# Patient Record
Sex: FEMALE | Race: WHITE | NOT HISPANIC OR LATINO | URBAN - METROPOLITAN AREA
[De-identification: names, ages, dates, MRNs, and addresses within clinical notes are randomized per-mention and may not be internally consistent; named-entity substitution may affect disease eponyms.]

---

## 2017-01-02 ENCOUNTER — EMERGENCY (EMERGENCY)
Facility: HOSPITAL | Age: 57
LOS: 1 days | Discharge: HOME | End: 2017-01-02
Attending: EMERGENCY MEDICINE | Admitting: EMERGENCY MEDICINE
Payer: COMMERCIAL

## 2017-01-02 VITALS
RESPIRATION RATE: 16 BRPM | OXYGEN SATURATION: 100 % | HEART RATE: 106 BPM | SYSTOLIC BLOOD PRESSURE: 163 MMHG | DIASTOLIC BLOOD PRESSURE: 94 MMHG | TEMPERATURE: 98 F

## 2017-01-02 PROCEDURE — 70360 X-RAY EXAM OF NECK: CPT | Mod: 26

## 2017-01-02 PROCEDURE — 99283 EMERGENCY DEPT VISIT LOW MDM: CPT

## 2017-01-02 PROCEDURE — 71020: CPT | Mod: 26

## 2017-01-02 NOTE — ED PROVIDER NOTE - CONDUCTED A DETAILED DISCUSSION WITH PATIENT AND/OR GUARDIAN REGARDING, MDM
radiology results radiology results/need for outpatient follow-up/return to ED if symptoms worsen, persist or questions arise

## 2017-01-02 NOTE — ED PROVIDER NOTE - MEDICAL DECISION MAKING DETAILS
57 yo F ho presents with throat irritations s/p swallowing a small piece of plastic package of sauce package. pt feels much better and able to swallow xray no acut abnormalities suggesting fb. pt now much better drank water in the ED

## 2017-01-02 NOTE — ED ADULT TRIAGE NOTE - CHIEF COMPLAINT QUOTE
states accidently swallowed a piece of plastic, from Duck Sauce packet, throat feels irritated.  Patient is able to speak clearly, no drooling, and trying to expectorate in traige.

## 2017-01-02 NOTE — ED PROVIDER NOTE - OBJECTIVE STATEMENT
57 yo F with hx of hypothyroidism presenting with sore throat. pt states she bit off the plastic package of topical sauce and the small pice that she bit off she swallowed by mistake. this resulted in her feeling irritation in the back of her throat. pt denies HA< dizziness, cp, sob, abd pain, n/v/d or dysuria. pt complains that she felt something stuck in her throat and has been able to breath and swallow her saliva without difficulties

## 2017-01-02 NOTE — ED PROVIDER NOTE - PSH
Impacted [place]  Tooth  1980  S/P Carpal Tunnel Release  right - 2000  S/P Left Oophorectomy  1981

## 2021-01-29 ENCOUNTER — APPOINTMENT (OUTPATIENT)
Dept: CV DIAGNOSITCS | Facility: HOSPITAL | Age: 61
End: 2021-01-29
Payer: COMMERCIAL

## 2021-01-29 ENCOUNTER — APPOINTMENT (OUTPATIENT)
Dept: CV DIAGNOSTICS | Facility: HOSPITAL | Age: 61
End: 2021-01-29
Payer: COMMERCIAL

## 2021-01-29 ENCOUNTER — OUTPATIENT (OUTPATIENT)
Dept: OUTPATIENT SERVICES | Facility: HOSPITAL | Age: 61
LOS: 1 days | End: 2021-01-29

## 2021-01-29 DIAGNOSIS — R07.9 CHEST PAIN, UNSPECIFIED: ICD-10-CM

## 2021-01-29 DIAGNOSIS — I10 ESSENTIAL (PRIMARY) HYPERTENSION: ICD-10-CM

## 2021-01-29 DIAGNOSIS — E78.5 HYPERLIPIDEMIA, UNSPECIFIED: ICD-10-CM

## 2021-01-29 PROCEDURE — 93018 CV STRESS TEST I&R ONLY: CPT | Mod: GC

## 2021-01-29 PROCEDURE — 93880 EXTRACRANIAL BILAT STUDY: CPT | Mod: 26

## 2021-01-29 PROCEDURE — 93016 CV STRESS TEST SUPVJ ONLY: CPT | Mod: GC

## 2022-04-07 ENCOUNTER — OUTPATIENT (OUTPATIENT)
Dept: OUTPATIENT SERVICES | Facility: HOSPITAL | Age: 62
LOS: 1 days | End: 2022-04-07
Payer: COMMERCIAL

## 2022-04-07 ENCOUNTER — TRANSCRIPTION ENCOUNTER (OUTPATIENT)
Age: 62
End: 2022-04-07

## 2022-04-07 VITALS
HEART RATE: 66 BPM | DIASTOLIC BLOOD PRESSURE: 66 MMHG | SYSTOLIC BLOOD PRESSURE: 130 MMHG | OXYGEN SATURATION: 97 % | RESPIRATION RATE: 18 BRPM

## 2022-04-07 VITALS
HEIGHT: 62 IN | SYSTOLIC BLOOD PRESSURE: 137 MMHG | HEART RATE: 70 BPM | DIASTOLIC BLOOD PRESSURE: 65 MMHG | WEIGHT: 149.91 LBS | TEMPERATURE: 98 F | OXYGEN SATURATION: 96 % | RESPIRATION RATE: 16 BRPM

## 2022-04-07 DIAGNOSIS — E89.0 POSTPROCEDURAL HYPOTHYROIDISM: Chronic | ICD-10-CM

## 2022-04-07 DIAGNOSIS — R06.00 DYSPNEA, UNSPECIFIED: ICD-10-CM

## 2022-04-07 DIAGNOSIS — R94.39 ABNORMAL RESULT OF OTHER CARDIOVASCULAR FUNCTION STUDY: ICD-10-CM

## 2022-04-07 LAB
ANION GAP SERPL CALC-SCNC: 14 MMOL/L — SIGNIFICANT CHANGE UP (ref 5–17)
BUN SERPL-MCNC: 15 MG/DL — SIGNIFICANT CHANGE UP (ref 7–23)
CALCIUM SERPL-MCNC: 9.4 MG/DL — SIGNIFICANT CHANGE UP (ref 8.4–10.5)
CHLORIDE SERPL-SCNC: 103 MMOL/L — SIGNIFICANT CHANGE UP (ref 96–108)
CO2 SERPL-SCNC: 26 MMOL/L — SIGNIFICANT CHANGE UP (ref 22–31)
CREAT SERPL-MCNC: 0.83 MG/DL — SIGNIFICANT CHANGE UP (ref 0.5–1.3)
EGFR: 79 ML/MIN/1.73M2 — SIGNIFICANT CHANGE UP
GLUCOSE SERPL-MCNC: 92 MG/DL — SIGNIFICANT CHANGE UP (ref 70–99)
HCT VFR BLD CALC: 42.3 % — SIGNIFICANT CHANGE UP (ref 34.5–45)
HGB BLD-MCNC: 14.2 G/DL — SIGNIFICANT CHANGE UP (ref 11.5–15.5)
MCHC RBC-ENTMCNC: 29.6 PG — SIGNIFICANT CHANGE UP (ref 27–34)
MCHC RBC-ENTMCNC: 33.6 GM/DL — SIGNIFICANT CHANGE UP (ref 32–36)
MCV RBC AUTO: 88.3 FL — SIGNIFICANT CHANGE UP (ref 80–100)
NRBC # BLD: 0 /100 WBCS — SIGNIFICANT CHANGE UP (ref 0–0)
PLATELET # BLD AUTO: 265 K/UL — SIGNIFICANT CHANGE UP (ref 150–400)
POTASSIUM SERPL-MCNC: 3.7 MMOL/L — SIGNIFICANT CHANGE UP (ref 3.5–5.3)
POTASSIUM SERPL-SCNC: 3.7 MMOL/L — SIGNIFICANT CHANGE UP (ref 3.5–5.3)
RBC # BLD: 4.79 M/UL — SIGNIFICANT CHANGE UP (ref 3.8–5.2)
RBC # FLD: 12.7 % — SIGNIFICANT CHANGE UP (ref 10.3–14.5)
SODIUM SERPL-SCNC: 143 MMOL/L — SIGNIFICANT CHANGE UP (ref 135–145)
WBC # BLD: 6.06 K/UL — SIGNIFICANT CHANGE UP (ref 3.8–10.5)
WBC # FLD AUTO: 6.06 K/UL — SIGNIFICANT CHANGE UP (ref 3.8–10.5)

## 2022-04-07 PROCEDURE — 93458 L HRT ARTERY/VENTRICLE ANGIO: CPT

## 2022-04-07 PROCEDURE — 99152 MOD SED SAME PHYS/QHP 5/>YRS: CPT

## 2022-04-07 PROCEDURE — 93005 ELECTROCARDIOGRAM TRACING: CPT

## 2022-04-07 PROCEDURE — 93458 L HRT ARTERY/VENTRICLE ANGIO: CPT | Mod: 26

## 2022-04-07 PROCEDURE — 80048 BASIC METABOLIC PNL TOTAL CA: CPT

## 2022-04-07 PROCEDURE — 36415 COLL VENOUS BLD VENIPUNCTURE: CPT

## 2022-04-07 PROCEDURE — 85027 COMPLETE CBC AUTOMATED: CPT

## 2022-04-07 PROCEDURE — C1894: CPT

## 2022-04-07 PROCEDURE — C1887: CPT

## 2022-04-07 PROCEDURE — 93010 ELECTROCARDIOGRAM REPORT: CPT

## 2022-04-07 PROCEDURE — C1769: CPT

## 2022-04-07 RX ORDER — SODIUM CHLORIDE 9 MG/ML
1000 INJECTION INTRAMUSCULAR; INTRAVENOUS; SUBCUTANEOUS
Refills: 0 | Status: COMPLETED | OUTPATIENT
Start: 2022-04-07 | End: 2022-04-07

## 2022-04-07 RX ORDER — SODIUM CHLORIDE 9 MG/ML
250 INJECTION INTRAMUSCULAR; INTRAVENOUS; SUBCUTANEOUS ONCE
Refills: 0 | Status: DISCONTINUED | OUTPATIENT
Start: 2022-04-07 | End: 2022-04-21

## 2022-04-07 RX ORDER — SODIUM CHLORIDE 9 MG/ML
3 INJECTION INTRAMUSCULAR; INTRAVENOUS; SUBCUTANEOUS EVERY 8 HOURS
Refills: 0 | Status: DISCONTINUED | OUTPATIENT
Start: 2022-04-07 | End: 2022-04-21

## 2022-04-07 RX ADMIN — SODIUM CHLORIDE 75 MILLILITER(S): 9 INJECTION INTRAMUSCULAR; INTRAVENOUS; SUBCUTANEOUS at 14:15

## 2022-04-07 RX ADMIN — SODIUM CHLORIDE 750 MILLILITER(S): 9 INJECTION INTRAMUSCULAR; INTRAVENOUS; SUBCUTANEOUS at 11:45

## 2022-04-07 NOTE — ASU PATIENT PROFILE, ADULT - FALL HARM RISK - UNIVERSAL INTERVENTIONS
Bed in lowest position, wheels locked, appropriate side rails in place/Call bell, personal items and telephone in reach/Instruct patient to call for assistance before getting out of bed or chair/Non-slip footwear when patient is out of bed/[place] to call system/Physically safe environment - no spills, clutter or unnecessary equipment/Purposeful Proactive Rounding/Room/bathroom lighting operational, light cord in reach

## 2022-04-07 NOTE — ASU DISCHARGE PLAN (ADULT/PEDIATRIC) - ASU DC SPECIAL INSTRUCTIONSFT
Wound Care:     The day AFTER your procedure remove bandage GENTLY, and clean using  mild soap and gentle warm, water stream, pat dry. leave OPEN to air. YOU MAY SHOWER   DO NOT apply lotions, creams, ointments, powder, perfumes to your incision site  DO NOT SOAK your site for 1 week ( no baths, no pools, no tubs, etc...)  Check  your groin and /or wrist daily. A small amount of bruising, and soreness are normal    ACTIVITY: for 24 hours   - DO NOT DRIVE  - DO NOT make any important decisions or sign legal documents   - DO NOT operate heavy machineries   - you may resume sexual activity in 48 hours, unless otherwise instructed by your cardiologist     Your procedure was done through the WRIST: for the NEXT 3DAYS:  - avoid pushing, pulling, with that affected wrist   - avoid repeated movement of that hand and wrist (eg: typing, hammering)  - DO NOT LIFT anything more than 5 lbs     MEDICATION:   take your medications as explained (see discharge paperwork)   If you received a STENT, you will be taking antiplatelet medications to KEEP YOUR STENT OPEN (eg: Aspirin, Plavix, Brilinta, Effient, etc).  Take as prescribed DO NOT STOP taking them without consulting with your cardiologist first.     Follow heart healthy diet recommended by your doctor, if you smoke STOP SMOKING ( may call [phone] for center of tobacco control if you need assistance)     CALL your doctor to make appointment in 2 WEEKS     ***CALL YOUR DOCTOR***  if you experience: fever, chills, body aches, or severe pain, swelling, redness, heat or yellow discharge at incision site  If you experience Bleeding or excruciating pain at the procedural site, swelling (golf ball size) at your procedural site  If you experience CHEST PAIN  If you experience extremity numbness, tingling, temperature change (of your procedural site)   If you are unable to reach your doctor, you may contact:   -Cardiology Office at [facility] at [phone] or   - [facility] [phone]  - [facility] [phone]

## 2022-04-07 NOTE — ASU PATIENT PROFILE, ADULT - PRO ARRIVE FROM
Problem: Patient/Family Goals  Goal: Patient/Family Long Term Goal  Patient's shortTerm Goal: decrease pain     Interventions:  - pain management   - IV Abx   - cool/ hot compress     - See additional Care Plan goals for specific interventions    Outcome: home

## 2022-04-07 NOTE — ASU DISCHARGE PLAN (ADULT/PEDIATRIC) - NS MD DC FALL RISK RISK
For information on Fall & Injury Prevention, visit: [email or web address] OR  [email or web address] OR  https://www.cdc.gov/steadi/patient.html

## 2022-04-07 NOTE — ASU PATIENT PROFILE, ADULT - NSICDXPASTSURGICALHX_GEN_ALL_CORE_FT
PAST SURGICAL HISTORY:  Impacted [place]  Tooth 1980    S/P Carpal Tunnel Release right - 2000    S/P Left Oophorectomy 1981

## 2022-04-07 NOTE — ASU DISCHARGE PLAN (ADULT/PEDIATRIC) - CARE PROVIDER_API CALL
Pete Briggs (DO)  Cardiology; Internal Medicine  [address]  [place] [address]  Phone: [phone]  Fax: [phone]  Follow Up Time: 2 weeks

## 2022-04-07 NOTE — H&P CARDIOLOGY - NSICDXPASTSURGICALHX_GEN_ALL_CORE_FT
PAST SURGICAL HISTORY:  Impacted [place]  Tooth 1980    S/P Carpal Tunnel Release right - 2000    S/P Left Oophorectomy 1981    S/P thyroidectomy

## 2022-04-07 NOTE — H&P CARDIOLOGY - HISTORY OF PRESENT ILLNESS
60 y/o female daily ETOH dependence (drinks wine 1-2 glasses per day), HLD, Malignant Neoplasm of Thyroid s/p Thyroidectomy on Synthroid followed by PCP Dr. DENVER Johnson and Cardiologist Dr. Pete Briggs reports having a 1 year history of exertional SOB when doing heavy lifting or walking up more than 3 flights of stairs.  She is s/p NST (results not on chart) as per RX from Dr. Briggs's office is abnormal.  Pt presents for [facility] for further evaluation.  She denies cp, sob, palpitations or implantable devices.  She is fully vaccinated and boosted with Pfizer.  Her COVID PCR is negative on 4/4/22.

## 2022-06-06 PROBLEM — E78.5 HYPERLIPIDEMIA, UNSPECIFIED: Chronic | Status: ACTIVE | Noted: 2022-04-07

## 2022-07-20 ENCOUNTER — APPOINTMENT (OUTPATIENT)
Dept: PULMONOLOGY | Facility: CLINIC | Age: 62
End: 2022-07-20

## 2022-07-20 VITALS
HEART RATE: 72 BPM | TEMPERATURE: 98.3 F | SYSTOLIC BLOOD PRESSURE: 119 MMHG | DIASTOLIC BLOOD PRESSURE: 66 MMHG | OXYGEN SATURATION: 95 %

## 2022-07-20 PROCEDURE — 94010 BREATHING CAPACITY TEST: CPT

## 2022-07-20 PROCEDURE — 95012 NITRIC OXIDE EXP GAS DETER: CPT

## 2022-07-20 PROCEDURE — 36415 COLL VENOUS BLD VENIPUNCTURE: CPT

## 2022-07-20 PROCEDURE — 71046 X-RAY EXAM CHEST 2 VIEWS: CPT

## 2022-07-20 PROCEDURE — 99204 OFFICE O/P NEW MOD 45 MIN: CPT | Mod: 25

## 2022-07-21 NOTE — ASSESSMENT
[FreeTextEntry1] : Venipuncture with labs drawn in office, including D-dimer.\par Will perform complete pulmonary function test for further evaluation.  Should PFT return normal, will then perform cardiopulmonary exercise test to elucidate the possible etiology of her dyspnea.

## 2022-07-21 NOTE — HISTORY OF PRESENT ILLNESS
[TextBox_4] : Ellen is a pleasant 62-year-old female with history of hypothyroidism, hypercholesterolemia, she came in complaining of shortness of breath for 1 year, no no cough or chest pain.  She recently underwent negative cardiology work-up by you.\par She is a non-smoker; she used to smoke marijuana, but quit long time ago.\par \par

## 2022-07-21 NOTE — PROCEDURE
[FreeTextEntry1] : Spirometry is within normal limits.\par \par  Exhaled Nitric Oxide             Final\par \par   Test   Result   Flag Reference Goal Last Verified \par   Exhaled Nitric Oxide 11      REQUIRED \par \par  Ordered by: AMOR WHITFIELD       Collected/Examined: [id number] 05:59PM       \par Verification Required       Stage: Final       \par  Performed at: In Office       Performed by: AMOR WHITFIELD       Resulted: [id number] 05:59PM       Last Updated: [id number] 05:59PM       \par \par \par \par  Xray Chest 2 Views PA/Lat             Final\par \par Chest x-ray PA and lateral views performed in my office today showed clear lungs, no evidence of infiltrates or pleural effusions. \par \par \par  Ordered by: AMOR WHITFIELD       Collected/Examined: [id number] 06:04PM       \par Verification Required       Stage: Final       \par  Performed at: In Office       Performed by: AMOR WHITFIELD       Resulted: [id number] 06:04PM       Last Updated: [id number] 06:05PM

## 2022-07-21 NOTE — DISCUSSION/SUMMARY
[FreeTextEntry1] : Ellen is a patient with persistent shortness of breath for 1 year, of unclear etiology at this point, rule out PE, rule out asthma, rule out deconditioning, etc.  Secondly, she is a patient with history of hypothyroidism.

## 2022-07-21 NOTE — CONSULT LETTER
[Dear  ___] : Dear  [unfilled], [Courtesy Letter:] : I had the pleasure of seeing your patient, [unfilled], in my office today. [Please see my note below.] : Please see my note below. [Consult Closing:] : Thank you very much for allowing me to participate in the care of this patient.  If you have any questions, please do not hesitate to contact me. [Sincerely,] : Sincerely, [FreeTextEntry3] : Dr. Danni Marcelino\par

## 2022-07-26 LAB
ANION GAP SERPL CALC-SCNC: 11 MMOL/L
BASOPHILS # BLD AUTO: 0.06 K/UL
BASOPHILS NFR BLD AUTO: 0.9 %
BUN SERPL-MCNC: 14 MG/DL
CALCIUM SERPL-MCNC: 9.6 MG/DL
CHLORIDE SERPL-SCNC: 102 MMOL/L
CO2 SERPL-SCNC: 28 MMOL/L
CREAT SERPL-MCNC: 0.96 MG/DL
DEPRECATED D DIMER PPP IA-ACNC: <150 NG/ML DDU
EGFR: 66 ML/MIN/1.73M2
EOSINOPHIL # BLD AUTO: 0.15 K/UL
EOSINOPHIL NFR BLD AUTO: 2.3 %
GLUCOSE SERPL-MCNC: 120 MG/DL
HCT VFR BLD CALC: 42.5 %
HGB BLD-MCNC: 13.9 G/DL
IMM GRANULOCYTES NFR BLD AUTO: 0.3 %
LYMPHOCYTES # BLD AUTO: 1.88 K/UL
LYMPHOCYTES NFR BLD AUTO: 28.4 %
MAN DIFF?: NORMAL
MCHC RBC-ENTMCNC: 30.1 PG
MCHC RBC-ENTMCNC: 32.7 GM/DL
MCV RBC AUTO: 92 FL
MONOCYTES # BLD AUTO: 0.43 K/UL
MONOCYTES NFR BLD AUTO: 6.5 %
NEUTROPHILS # BLD AUTO: 4.09 K/UL
NEUTROPHILS NFR BLD AUTO: 61.6 %
PLATELET # BLD AUTO: 291 K/UL
POTASSIUM SERPL-SCNC: 4.2 MMOL/L
RBC # BLD: 4.62 M/UL
RBC # FLD: 13.2 %
SODIUM SERPL-SCNC: 141 MMOL/L
TOTAL IGE SMQN RAST: 34 KU/L
WBC # FLD AUTO: 6.63 K/UL

## 2022-08-02 ENCOUNTER — APPOINTMENT (OUTPATIENT)
Dept: PULMONOLOGY | Facility: CLINIC | Age: 62
End: 2022-08-02

## 2022-08-30 ENCOUNTER — APPOINTMENT (OUTPATIENT)
Dept: PULMONOLOGY | Facility: CLINIC | Age: 62
End: 2022-08-30

## 2022-08-30 VITALS
DIASTOLIC BLOOD PRESSURE: 75 MMHG | OXYGEN SATURATION: 96 % | TEMPERATURE: 98 F | HEART RATE: 75 BPM | SYSTOLIC BLOOD PRESSURE: 119 MMHG

## 2022-08-30 PROCEDURE — 94010 BREATHING CAPACITY TEST: CPT

## 2022-08-30 PROCEDURE — 94729 DIFFUSING CAPACITY: CPT

## 2022-08-30 PROCEDURE — ZZZZZ: CPT

## 2022-08-30 PROCEDURE — 94727 GAS DIL/WSHOT DETER LNG VOL: CPT

## 2022-08-30 PROCEDURE — 99214 OFFICE O/P EST MOD 30 MIN: CPT | Mod: 25

## 2022-08-30 PROCEDURE — 88738 HGB QUANT TRANSCUTANEOUS: CPT

## 2022-08-30 NOTE — PROCEDURE
[FreeTextEntry1] : Pulmonary Function Test performed in my office today: Spirometry: Within normal limits; Lung Volume: Within normal limits; Diffusion: Within normal limits.\par

## 2022-08-30 NOTE — ASSESSMENT
[FreeTextEntry1] : Pulmonary work-up for dyspnea is completely unremarkable, so I do not see any obvious pulmonary causes for her exertional dyspnea.  \par Will perform cardiopulmonary exercise test to elucidate the possible etiology of her dyspnea.\par Advised her to return for pulmonary follow-up after cardiopulmonary exercise test has been performed.\par Medications reviewed. Continue present medications.\par

## 2022-08-30 NOTE — DISCUSSION/SUMMARY
[FreeTextEntry1] : Ellen is a patient with persistent shortness of breath for 1 year, of unclear etiology at this point, rule out deconditioning, etc.  Secondly, she is a patient with history of hypothyroidism.

## 2022-08-30 NOTE — HISTORY OF PRESENT ILLNESS
[TextBox_4] : Ellen is a pleasant 62-year-old female with history of hypothyroidism, hypercholesterolemia, she came in complaining of s exertional dyspnea for 1 year.

## 2023-02-05 ENCOUNTER — EMERGENCY (EMERGENCY)
Facility: HOSPITAL | Age: 63
LOS: 1 days | Discharge: HOME | End: 2023-02-05
Attending: EMERGENCY MEDICINE
Payer: COMMERCIAL

## 2023-02-05 VITALS
SYSTOLIC BLOOD PRESSURE: 166 MMHG | RESPIRATION RATE: 18 BRPM | HEART RATE: 77 BPM | OXYGEN SATURATION: 98 % | WEIGHT: 149.91 LBS | DIASTOLIC BLOOD PRESSURE: 83 MMHG | TEMPERATURE: 98 F

## 2023-02-05 VITALS
SYSTOLIC BLOOD PRESSURE: 147 MMHG | RESPIRATION RATE: 18 BRPM | DIASTOLIC BLOOD PRESSURE: 82 MMHG | TEMPERATURE: 98 F | OXYGEN SATURATION: 98 % | HEART RATE: 69 BPM

## 2023-02-05 DIAGNOSIS — E89.0 POSTPROCEDURAL HYPOTHYROIDISM: Chronic | ICD-10-CM

## 2023-02-05 LAB
ALBUMIN SERPL ELPH-MCNC: 4.6 G/DL — SIGNIFICANT CHANGE UP (ref 3.3–5)
ALP SERPL-CCNC: 101 U/L — SIGNIFICANT CHANGE UP (ref 40–120)
ALT FLD-CCNC: 59 U/L — HIGH (ref 10–45)
ANION GAP SERPL CALC-SCNC: 12 MMOL/L — SIGNIFICANT CHANGE UP (ref 5–17)
AST SERPL-CCNC: 45 U/L — HIGH (ref 10–40)
BASOPHILS # BLD AUTO: 0.04 K/UL — SIGNIFICANT CHANGE UP (ref 0–0.2)
BASOPHILS NFR BLD AUTO: 0.5 % — SIGNIFICANT CHANGE UP (ref 0–2)
BILIRUB SERPL-MCNC: 0.4 MG/DL — SIGNIFICANT CHANGE UP (ref 0.2–1.2)
BUN SERPL-MCNC: 14 MG/DL — SIGNIFICANT CHANGE UP (ref 7–23)
CALCIUM SERPL-MCNC: 9.5 MG/DL — SIGNIFICANT CHANGE UP (ref 8.4–10.5)
CHLORIDE SERPL-SCNC: 104 MMOL/L — SIGNIFICANT CHANGE UP (ref 96–108)
CO2 SERPL-SCNC: 24 MMOL/L — SIGNIFICANT CHANGE UP (ref 22–31)
CREAT SERPL-MCNC: 0.74 MG/DL — SIGNIFICANT CHANGE UP (ref 0.5–1.3)
EGFR: 90 ML/MIN/1.73M2 — SIGNIFICANT CHANGE UP
EOSINOPHIL # BLD AUTO: 0.15 K/UL — SIGNIFICANT CHANGE UP (ref 0–0.5)
EOSINOPHIL NFR BLD AUTO: 2 % — SIGNIFICANT CHANGE UP (ref 0–6)
GLUCOSE SERPL-MCNC: 102 MG/DL — HIGH (ref 70–99)
HCT VFR BLD CALC: 41.3 % — SIGNIFICANT CHANGE UP (ref 34.5–45)
HGB BLD-MCNC: 13.6 G/DL — SIGNIFICANT CHANGE UP (ref 11.5–15.5)
IMM GRANULOCYTES NFR BLD AUTO: 0.4 % — SIGNIFICANT CHANGE UP (ref 0–0.9)
LIDOCAIN IGE QN: 38 U/L — SIGNIFICANT CHANGE UP (ref 7–60)
LYMPHOCYTES # BLD AUTO: 1.7 K/UL — SIGNIFICANT CHANGE UP (ref 1–3.3)
LYMPHOCYTES # BLD AUTO: 23.1 % — SIGNIFICANT CHANGE UP (ref 13–44)
MCHC RBC-ENTMCNC: 29.5 PG — SIGNIFICANT CHANGE UP (ref 27–34)
MCHC RBC-ENTMCNC: 32.9 GM/DL — SIGNIFICANT CHANGE UP (ref 32–36)
MCV RBC AUTO: 89.6 FL — SIGNIFICANT CHANGE UP (ref 80–100)
MONOCYTES # BLD AUTO: 0.44 K/UL — SIGNIFICANT CHANGE UP (ref 0–0.9)
MONOCYTES NFR BLD AUTO: 6 % — SIGNIFICANT CHANGE UP (ref 2–14)
NEUTROPHILS # BLD AUTO: 5.01 K/UL — SIGNIFICANT CHANGE UP (ref 1.8–7.4)
NEUTROPHILS NFR BLD AUTO: 68 % — SIGNIFICANT CHANGE UP (ref 43–77)
NRBC # BLD: 0 /100 WBCS — SIGNIFICANT CHANGE UP (ref 0–0)
PLATELET # BLD AUTO: 273 K/UL — SIGNIFICANT CHANGE UP (ref 150–400)
POTASSIUM SERPL-MCNC: 3.7 MMOL/L — SIGNIFICANT CHANGE UP (ref 3.5–5.3)
POTASSIUM SERPL-SCNC: 3.7 MMOL/L — SIGNIFICANT CHANGE UP (ref 3.5–5.3)
PROT SERPL-MCNC: 7.2 G/DL — SIGNIFICANT CHANGE UP (ref 6–8.3)
RBC # BLD: 4.61 M/UL — SIGNIFICANT CHANGE UP (ref 3.8–5.2)
RBC # FLD: 13.2 % — SIGNIFICANT CHANGE UP (ref 10.3–14.5)
SARS-COV-2 RNA SPEC QL NAA+PROBE: SIGNIFICANT CHANGE UP
SODIUM SERPL-SCNC: 140 MMOL/L — SIGNIFICANT CHANGE UP (ref 135–145)
TROPONIN T, HIGH SENSITIVITY RESULT: 10 NG/L — SIGNIFICANT CHANGE UP (ref 0–51)
TROPONIN T, HIGH SENSITIVITY RESULT: 8 NG/L — SIGNIFICANT CHANGE UP (ref 0–51)
WBC # BLD: 7.37 K/UL — SIGNIFICANT CHANGE UP (ref 3.8–10.5)
WBC # FLD AUTO: 7.37 K/UL — SIGNIFICANT CHANGE UP (ref 3.8–10.5)

## 2023-02-05 PROCEDURE — 36415 COLL VENOUS BLD VENIPUNCTURE: CPT

## 2023-02-05 PROCEDURE — 99285 EMERGENCY DEPT VISIT HI MDM: CPT

## 2023-02-05 PROCEDURE — 85025 COMPLETE CBC W/AUTO DIFF WBC: CPT

## 2023-02-05 PROCEDURE — 84484 ASSAY OF TROPONIN QUANT: CPT

## 2023-02-05 PROCEDURE — 84295 ASSAY OF SERUM SODIUM: CPT

## 2023-02-05 PROCEDURE — 83605 ASSAY OF LACTIC ACID: CPT

## 2023-02-05 PROCEDURE — 85018 HEMOGLOBIN: CPT

## 2023-02-05 PROCEDURE — 85014 HEMATOCRIT: CPT

## 2023-02-05 PROCEDURE — 71046 X-RAY EXAM CHEST 2 VIEWS: CPT

## 2023-02-05 PROCEDURE — 82803 BLOOD GASES ANY COMBINATION: CPT

## 2023-02-05 PROCEDURE — 83690 ASSAY OF LIPASE: CPT

## 2023-02-05 PROCEDURE — 82435 ASSAY OF BLOOD CHLORIDE: CPT

## 2023-02-05 PROCEDURE — 93005 ELECTROCARDIOGRAM TRACING: CPT

## 2023-02-05 PROCEDURE — 82947 ASSAY GLUCOSE BLOOD QUANT: CPT

## 2023-02-05 PROCEDURE — 82330 ASSAY OF CALCIUM: CPT

## 2023-02-05 PROCEDURE — U0003: CPT

## 2023-02-05 PROCEDURE — 99285 EMERGENCY DEPT VISIT HI MDM: CPT | Mod: 25

## 2023-02-05 PROCEDURE — 71046 X-RAY EXAM CHEST 2 VIEWS: CPT | Mod: 26

## 2023-02-05 PROCEDURE — 84132 ASSAY OF SERUM POTASSIUM: CPT

## 2023-02-05 PROCEDURE — 80053 COMPREHEN METABOLIC PANEL: CPT

## 2023-02-05 PROCEDURE — U0005: CPT

## 2023-02-05 NOTE — ED ADULT NURSE REASSESSMENT NOTE - NS ED NURSE REASSESS COMMENT FT1
Report received from CHANTELL Sandoval. Pt A,Ox4, resting comfortably in stretcher, VSS, denies any pain, pending second troponin results.

## 2023-02-05 NOTE — ED ADULT NURSE NOTE - PAIN: PRESENCE, MLM
Additional Notes: Consider consult with Susan\\Taz diane wash once or twice daily\\nRetinol 2x samples given to use at night complains of pain/discomfort Detail Level: Simple negative...

## 2023-02-05 NOTE — ED PROVIDER NOTE - ATTENDING CONTRIBUTION TO CARE
attending Deni: 62yF h/o HLD, Malignant Neoplasm of Thyroid s/p Thyroidectomy on Synthroid, presents after episode of chest pain 1 hour prior to arrival. Occurred while she was cooking, assoc with nausea and one episode of vomiting, episodes lasted minutes and resolved after vomiting. Of note, pt had extensive cardiac workup 13 months ago, including negative cardiac cath. Currently asymptomatic. Examination as above. Will obtain ekg, place on tele, labs including trop, reassess

## 2023-02-05 NOTE — ED PROVIDER NOTE - PATIENT PORTAL LINK FT
You can access the FollowMyHealth Patient Portal offered by [facility] by registering at the following website: [email or web address]. By joining [facility] FollowMyHealth portal, you will also be able to view your health information using other applications (apps) compatible with our system.

## 2023-02-05 NOTE — ED ADULT NURSE NOTE - NS ED NURSE DC INFO COMPLEXITY
· Secondary to rhabdomyolysis  No evidence of liver injury on CT imaging  · Seen by GI  Hepatic duplex also negative      Results from last 7 days   Lab Units 09/25/22  0551 09/24/22  0548 09/23/22  0530 09/21/22  0550   AST U/L 82* 111* 167* 489*   ALT U/L 215* 240* 292* 407*   TOTAL BILIRUBIN mg/dL 0 42 0 41 0 40 0 30 Simple: Patient demonstrates quick and easy understanding

## 2023-02-05 NOTE — ED PROVIDER NOTE - NS ED ROS FT
CONST: no fevers, no chills, no trauma  EYES: no blurry vision   ENT: no sore throat  CV: (+) chest pain, no extremity swelling  RESP: no shortness of breath  ABD: no abdominal pain, (+) nausea, (+) vomiting, no diarrhea, no melena  : no dysuria, no hematuria, no frequency  MSK: no back pain, no neck pain, no extremity pain  NEURO: no headache, no focal weakness, no dizziness  HEME: no bruising  SKIN: no rash

## 2023-02-05 NOTE — ED ADULT NURSE NOTE - NSIMPLEMENTINTERV_GEN_ALL_ED
Implemented All Universal Safety Interventions:  [place] to call system. Call bell, personal items and telephone within reach. Instruct patient to call for assistance. Room bathroom lighting operational. Non-slip footwear when patient is off stretcher. Physically safe environment: no spills, clutter or unnecessary equipment. Stretcher in lowest position, wheels locked, appropriate side rails in place.

## 2023-02-05 NOTE — ED PROVIDER NOTE - CLINICAL SUMMARY MEDICAL DECISION MAKING FREE TEXT BOX
61 yo Female with PMhx HLD, Malignant Neoplasm of Thyroid s/p Thyroidectomy on Synthroid, presenting with chest pain starting 1 hour prior to arrival. Vitals stable. Concern for ACS vs pancreatitis. Will get labs, lipase, CXR, and trop. Will have to repeat trop 3 arrival prior to CP episode.

## 2023-02-05 NOTE — ED ADULT NURSE NOTE - OBJECTIVE STATEMENT
First encounter with the pt, pt received from triage with complaints of upper abd for one hour and had vomiting. Pt states that she had [id number] before but states that this feels different,  Pt is a/ox4 and verbal. Speech is clear and able to speak in full sentences without distress. Airway is patent with no obstruction nor blocking secretions. Breathing is even and unlabored on room air. Pt has strong pulses palpated bilaterally. Pt is SR on the cardiac monitor. Neuro check is wnl. Full rom. Pt currently denies chest pain, n/v and sob. No acute distress noted. Pt has call light within the reach of the pt at the bedside. Will continue to monitor the pt.

## 2023-02-05 NOTE — ED PROVIDER NOTE - NSICDXPASTSURGICALHX_GEN_ALL_CORE_FT
PAST SURGICAL HISTORY:  Impacted [place]  Tooth 1980    S/P Carpal Tunnel Release right - 2000    S/P Left Oophorectomy 1981    S/P thyroidectomy

## 2023-02-05 NOTE — ED PROVIDER NOTE - NSFOLLOWUPCLINICS_GEN_ALL_ED_FT
[facility]  Cardiology  [address] [place] [id number]  Phone: [phone]  Fax:

## 2023-02-05 NOTE — ED PROVIDER NOTE - PROGRESS NOTE DETAILS
received sign out from Dr Cheema pending labs trop. briefly 62yr F hx of hypothyroidism hl w chest pain 1hr pta. concern for ACS however low risk per report for outpt follow up. DJ Trop neg x2  Patient was re-evaluated and doing well. Results, including any incidental findings, were discussed. Return precautions and follow up were discussed. Patient verbalized understanding.

## 2023-02-05 NOTE — ED PROVIDER NOTE - WR ORDER ID 1
[id number]

## 2023-02-05 NOTE — ED PROVIDER NOTE - OBJECTIVE STATEMENT
61 yo Female with PMhx HLD, Malignant Neoplasm of Thyroid s/p Thyroidectomy on Synthroid, presenting with chest pain starting 1 hour prior to arrival. Patient was cooking when she has sudden onset of CP with associated n/v. Patient's pain improved after vomiting. Episode lasted for 30 minutes. Patient denies diaphoresis. Patient denies fever, SOB, abdominal pain, and dysuria. Patient had cardiac work up 1 year ago, became SOB on stress so had cardiac cath, which was negative.

## 2023-03-14 ENCOUNTER — APPOINTMENT (OUTPATIENT)
Dept: NEUROLOGY | Facility: CLINIC | Age: 63
End: 2023-03-14
Payer: COMMERCIAL

## 2023-03-14 VITALS — DIASTOLIC BLOOD PRESSURE: 70 MMHG | HEART RATE: 77 BPM | SYSTOLIC BLOOD PRESSURE: 125 MMHG | WEIGHT: 150 LBS

## 2023-03-14 DIAGNOSIS — Z82.49 FAMILY HISTORY OF ISCHEMIC HEART DISEASE AND OTHER DISEASES OF THE CIRCULATORY SYSTEM: ICD-10-CM

## 2023-03-14 DIAGNOSIS — I65.29 OCCLUSION AND STENOSIS OF UNSPECIFIED CAROTID ARTERY: ICD-10-CM

## 2023-03-14 PROCEDURE — 99205 OFFICE O/P NEW HI 60 MIN: CPT

## 2023-03-14 RX ORDER — MELOXICAM 15 MG/1
15 TABLET ORAL
Refills: 0 | Status: ACTIVE | COMMUNITY

## 2023-03-14 RX ORDER — ASPIRIN 81 MG/1
81 TABLET, COATED ORAL
Refills: 0 | Status: ACTIVE | COMMUNITY

## 2023-03-14 RX ORDER — ATORVASTATIN CALCIUM 40 MG/1
40 TABLET, FILM COATED ORAL
Refills: 0 | Status: ACTIVE | COMMUNITY

## 2023-03-14 RX ORDER — LEVOTHYROXINE SODIUM 100 UG/1
100 TABLET ORAL
Refills: 0 | Status: ACTIVE | COMMUNITY

## 2023-03-14 NOTE — PHYSICAL EXAM
[FreeTextEntry1] : General physical examination:\par The patient is well-appearing. VS are stable There is no tenderness over the scalp or neck and no bruits over the eyes or at the neck. There is no proptosis, lid swelling, conjunctival injection, or chemosis. Cardiac exam shows a regular rate and no murmur.\par Neurologic examination:\par Mental status:\par The patient is alert, attentive, and oriented. Speech is clear and fluent with good repetition, comprehension, and naming. Recalls 3/3 objects at 5 minutes.\par Cranial nerves:\par CN II: Visual fields are full to confrontation. Pupils are 4 mm and briskly reactive to light. bilaterally.\par CN III, IV, VI: At primary gaze, there is no eye deviation.EOMI. No ptosis\par CN V: Facial sensation is intact bilaterally. \par CN VII: Face is symmetric with normal eye closure and smile.\par CN VII: Hearing is normal to rubbing fingers\par CN IX, X: Palate elevates symmetrically. Phonation is normal.\par CN XI: Head turning and shoulder shrug are intact\par CN XII: Tongue is midline with normal movements and no atrophy.\par Motor:\par There is no pronator drift of out-stretched arms. Muscle bulk and tone are normal. Strength is full bilaterally.\par 	Deltoid	Biceps	Triceps	Wrist extension	Finger abduction	Hip flexion	Hip extension	Knee flexion	Knee extension	Ankle flexion	Ankle extension	\par L	5	5	5	5	5	5	5	5	5	5	5	\par R	5	5	5	5	5	5	5	5	5	5	5	\par Sensory:\par Light touch intact in fingers and toes.\par Coordination:\par Rapid alternating movements and fine finger movements are intact. There is no dysmetria on finger-to-nose and heel-knee-shin. There are no abnormal or extraneous movements. Romberg is absent.\par Gait/Stance:\par Posture is normal. Gait is steady with normal steps, base, arm swing, and turning. Heel and toe walking are normal. Tandem gait is normal \par

## 2023-03-14 NOTE — DISCUSSION/SUMMARY
[Antithrombotic therapy with ___] : antithrombotic therapy with  [unfilled] [Intensive Blood Pressure Control] : intensive blood pressure control [Lipid Lowering Therapy] : lipid lowering therapy [Patient encouraged to discuss with Primary MD] : I encouraged the patient to discuss these important issues with ~his/her~ primary care doctor [Goals and Counseling] : I have reviewed the goals of stroke risk factor modification. I counseled the patient on measures to reduce stroke risk, including the importance of medication compliance, risk factor control, exercise, healthy diet and avoidance of smoking. I reviewed stroke warning signs and symptoms and appropriate actions to take if such occur. [FreeTextEntry1] : Impression:\par For about 1 year she had been complaining of shortness of breath, with no cough or chest pain. She consulted with a cardiologist who did a thorough workup that revealed no cause of SOB but reportedly showed carotid stenosis on Doppler. She was referred to us for further evaluation of this carotid stenosis. She denies any unilateral weakness, visual issues, or speech concerns. \par went to cardiologist was getting SOB - \par \par Overall patient is neurologically stable. Continue ASA 81 mg once daily. Continue to follow up with PCP/cardiology for BP and statin management (goal LDL <70) as well as all routine primary care needs.\par MRI head, MRA head and neck to evaluate carotid stenosis. Screened patient for sleep apnea with no identifiable risk factors at this time. Will reevaluate next visit. We discussed aggressive vascular strict risk factor control.\par \par Follow up with me in 1-2 months and stroke MD at next available. Transcranial and Carotid Doppler's to be obtained at next appointment. Patient and family were educated on signs and symptoms of stroke and to contact 911 immediately if experiencing any. \par \par Lab work and Doppler report requested from \[facility] Cardiology: Dr. Briggs - [facility]

## 2023-03-14 NOTE — HISTORY OF PRESENT ILLNESS
[FreeTextEntry1] : Ms. Moore is a 62 year old right handed female PMHx hypothyroidism, HLD, HTN who presents today for evaluation of carotid stenosis. \par \par For about 1 year she had been complaining of shortness of breath, with no cough or chest pain. She consulted with a cardiologist who did a thorough workup that revealed no cause of SOB but reportedly showed carotid stenosis on Doppler. She was referred to us for further evaluation of this carotid stenosis. \par \par We have no records as yet but she notes that she was started on ASA and her statin was changed to atorvastatin after stenosis was noted. She denies any unilateral weakness, visual issues, or speech concerns. She states she father needed a CEA when he was 81 but  shortly after procedure.  \par \par \par \par \par \par \par \par \par \par \par

## 2023-03-21 RX ORDER — ROSUVASTATIN CALCIUM 10 MG/1
10 TABLET, FILM COATED ORAL
Refills: 0 | Status: ACTIVE | COMMUNITY
Start: 2023-03-21

## 2023-03-21 RX ORDER — AMLODIPINE BESYLATE 5 MG/1
5 TABLET ORAL
Refills: 0 | Status: ACTIVE | COMMUNITY
Start: 2023-03-21

## 2023-04-04 ENCOUNTER — APPOINTMENT (OUTPATIENT)
Dept: MRI IMAGING | Facility: IMAGING CENTER | Age: 63
End: 2023-04-04
Payer: COMMERCIAL

## 2023-04-04 ENCOUNTER — OUTPATIENT (OUTPATIENT)
Dept: OUTPATIENT SERVICES | Facility: HOSPITAL | Age: 63
LOS: 1 days | End: 2023-04-04
Payer: COMMERCIAL

## 2023-04-04 ENCOUNTER — RESULT REVIEW (OUTPATIENT)
Age: 63
End: 2023-04-04

## 2023-04-04 DIAGNOSIS — I65.29 OCCLUSION AND STENOSIS OF UNSPECIFIED CAROTID ARTERY: ICD-10-CM

## 2023-04-04 DIAGNOSIS — R50.9 FEVER, UNSPECIFIED: ICD-10-CM

## 2023-04-04 DIAGNOSIS — E89.0 POSTPROCEDURAL HYPOTHYROIDISM: Chronic | ICD-10-CM

## 2023-04-04 PROCEDURE — 70549 MR ANGIOGRAPH NECK W/O&W/DYE: CPT | Mod: 26

## 2023-04-04 PROCEDURE — A9585: CPT

## 2023-04-04 PROCEDURE — 70551 MRI BRAIN STEM W/O DYE: CPT

## 2023-04-04 PROCEDURE — 70549 MR ANGIOGRAPH NECK W/O&W/DYE: CPT

## 2023-04-04 PROCEDURE — 70551 MRI BRAIN STEM W/O DYE: CPT | Mod: 26

## 2023-04-04 PROCEDURE — 70544 MR ANGIOGRAPHY HEAD W/O DYE: CPT

## 2023-04-04 PROCEDURE — 70544 MR ANGIOGRAPHY HEAD W/O DYE: CPT | Mod: 26,59

## 2023-04-11 ENCOUNTER — NON-APPOINTMENT (OUTPATIENT)
Age: 63
End: 2023-04-11

## 2023-05-16 ENCOUNTER — TRANSCRIPTION ENCOUNTER (OUTPATIENT)
Age: 63
End: 2023-05-16

## 2023-05-17 ENCOUNTER — NON-APPOINTMENT (OUTPATIENT)
Age: 63
End: 2023-05-17

## 2023-05-17 ENCOUNTER — APPOINTMENT (OUTPATIENT)
Dept: PULMONOLOGY | Facility: CLINIC | Age: 63
End: 2023-05-17
Payer: COMMERCIAL

## 2023-05-17 DIAGNOSIS — Z20.822 CONTACT WITH AND (SUSPECTED) EXPOSURE TO COVID-19: ICD-10-CM

## 2023-05-17 LAB — SARS-COV-2 AG RESP QL IA.RAPID: NEGATIVE

## 2023-05-17 PROCEDURE — 87811 SARS-COV-2 COVID19 W/OPTIC: CPT

## 2023-05-17 PROCEDURE — 94621 CARDIOPULM EXERCISE TESTING: CPT

## 2023-05-17 PROCEDURE — 94375 RESPIRATORY FLOW VOLUME LOOP: CPT

## 2023-05-19 ENCOUNTER — APPOINTMENT (OUTPATIENT)
Dept: PULMONOLOGY | Facility: CLINIC | Age: 63
End: 2023-05-19
Payer: COMMERCIAL

## 2023-05-19 VITALS
DIASTOLIC BLOOD PRESSURE: 79 MMHG | OXYGEN SATURATION: 96 % | SYSTOLIC BLOOD PRESSURE: 123 MMHG | RESPIRATION RATE: 16 BRPM | HEART RATE: 78 BPM

## 2023-05-19 VITALS — WEIGHT: 151 LBS

## 2023-05-19 DIAGNOSIS — R53.81 OTHER MALAISE: ICD-10-CM

## 2023-05-19 DIAGNOSIS — R06.00 DYSPNEA, UNSPECIFIED: ICD-10-CM

## 2023-05-19 DIAGNOSIS — E03.9 HYPOTHYROIDISM, UNSPECIFIED: ICD-10-CM

## 2023-05-19 PROCEDURE — 99214 OFFICE O/P EST MOD 30 MIN: CPT

## 2023-05-21 PROBLEM — E03.9 HYPOTHYROIDISM: Status: ACTIVE | Noted: 2022-07-21

## 2023-05-21 PROBLEM — R06.00 DYSPNEA: Status: ACTIVE | Noted: 2022-07-20

## 2023-05-21 PROBLEM — R53.81 PHYSICAL DECONDITIONING: Status: ACTIVE | Noted: 2023-05-21

## 2023-05-21 NOTE — DISCUSSION/SUMMARY
[FreeTextEntry1] : Ellen is a patient with persistent shortness of breath for 1 year, likely secondary to physical deconditioning.  Secondly, she is a patient with history of hypothyroidism.

## 2023-05-21 NOTE — PROCEDURE
[FreeTextEntry1] : Cardiopulmonary stress test done on May 17, 2023 showed the peak VO2 obtained is normal.  This is achieved at acceptable work.  Anaerobic threshold and breathing reserve are normal.  Please correlate clinically.

## 2023-05-21 NOTE — ASSESSMENT
[FreeTextEntry1] : Discussed with Ellen at length regarding aforementioned cardiopulmonary stress test result in the office today.  \par Advised her on getting regular aerobic physical exercise for better physical conditioning.  \par Return for pulmonary follow-up in 6 months.  \par

## 2023-09-17 NOTE — ED PROVIDER NOTE - NSFOLLOWUPINSTRUCTIONS_ED_ALL_ED_FT
denies pain/discomfort (Rating = 0) You were seen today in the emergency room for chest pain.    Although the testing done today indicates that your pain is not from an acute emergency, your pain could still represent a problem with your heart. You need to follow up with your doctor and/or a cardiologist in the next 48-72 hours.     If you develop any new or worsening symptoms you need to return immediately to the emergency department. If you experience any of the following please come right back to the emergency room: chest pain that becomes much worse with walking up stairs or exercising, uncontrollable nausea and vomiting, severe chest pain that will not go away, passing out, new persistent numbness and/or weakness.

## 2023-11-03 ENCOUNTER — APPOINTMENT (OUTPATIENT)
Dept: PULMONOLOGY | Facility: CLINIC | Age: 63
End: 2023-11-03

## 2024-04-23 ENCOUNTER — INPATIENT (INPATIENT)
Facility: HOSPITAL | Age: 64
LOS: 0 days | Discharge: HOME | DRG: 313 | End: 2024-04-24
Attending: INTERNAL MEDICINE | Admitting: INTERNAL MEDICINE
Payer: COMMERCIAL

## 2024-04-23 VITALS
OXYGEN SATURATION: 97 % | HEART RATE: 74 BPM | WEIGHT: 160.06 LBS | TEMPERATURE: 98 F | SYSTOLIC BLOOD PRESSURE: 130 MMHG | DIASTOLIC BLOOD PRESSURE: 71 MMHG | RESPIRATION RATE: 18 BRPM

## 2024-04-23 DIAGNOSIS — E89.0 POSTPROCEDURAL HYPOTHYROIDISM: Chronic | ICD-10-CM

## 2024-04-23 DIAGNOSIS — R07.9 CHEST PAIN, UNSPECIFIED: ICD-10-CM

## 2024-04-23 LAB
ADD ON TEST-SPECIMEN IN LAB: SIGNIFICANT CHANGE UP
ALBUMIN SERPL ELPH-MCNC: 4.7 G/DL — SIGNIFICANT CHANGE UP (ref 3.3–5)
ALP SERPL-CCNC: 78 U/L — SIGNIFICANT CHANGE UP (ref 40–120)
ALT FLD-CCNC: 26 U/L — SIGNIFICANT CHANGE UP (ref 10–45)
ANION GAP SERPL CALC-SCNC: 14 MMOL/L — SIGNIFICANT CHANGE UP (ref 5–17)
APTT BLD: 34.1 SEC — SIGNIFICANT CHANGE UP (ref 24.5–35.6)
AST SERPL-CCNC: 26 U/L — SIGNIFICANT CHANGE UP (ref 10–40)
BASOPHILS # BLD AUTO: 0.05 K/UL — SIGNIFICANT CHANGE UP (ref 0–0.2)
BASOPHILS NFR BLD AUTO: 0.6 % — SIGNIFICANT CHANGE UP (ref 0–2)
BILIRUB SERPL-MCNC: 0.2 MG/DL — SIGNIFICANT CHANGE UP (ref 0.2–1.2)
BUN SERPL-MCNC: 16 MG/DL — SIGNIFICANT CHANGE UP (ref 7–23)
CALCIUM SERPL-MCNC: 9.9 MG/DL — SIGNIFICANT CHANGE UP (ref 8.4–10.5)
CHLORIDE SERPL-SCNC: 104 MMOL/L — SIGNIFICANT CHANGE UP (ref 96–108)
CK MB CFR SERPL CALC: 2.5 NG/ML — SIGNIFICANT CHANGE UP (ref 0–3.8)
CK SERPL-CCNC: 66 U/L — SIGNIFICANT CHANGE UP (ref 25–170)
CO2 SERPL-SCNC: 24 MMOL/L — SIGNIFICANT CHANGE UP (ref 22–31)
CREAT SERPL-MCNC: 0.97 MG/DL — SIGNIFICANT CHANGE UP (ref 0.5–1.3)
EGFR: 65 ML/MIN/1.73M2 — SIGNIFICANT CHANGE UP
EOSINOPHIL # BLD AUTO: 0.15 K/UL — SIGNIFICANT CHANGE UP (ref 0–0.5)
EOSINOPHIL NFR BLD AUTO: 1.8 % — SIGNIFICANT CHANGE UP (ref 0–6)
GLUCOSE SERPL-MCNC: 104 MG/DL — HIGH (ref 70–99)
HCT VFR BLD CALC: 42.7 % — SIGNIFICANT CHANGE UP (ref 34.5–45)
HGB BLD-MCNC: 14.2 G/DL — SIGNIFICANT CHANGE UP (ref 11.5–15.5)
IMM GRANULOCYTES NFR BLD AUTO: 0.2 % — SIGNIFICANT CHANGE UP (ref 0–0.9)
INR BLD: 1 RATIO — SIGNIFICANT CHANGE UP (ref 0.85–1.18)
LIDOCAIN IGE QN: 41 U/L — SIGNIFICANT CHANGE UP (ref 7–60)
LYMPHOCYTES # BLD AUTO: 1.83 K/UL — SIGNIFICANT CHANGE UP (ref 1–3.3)
LYMPHOCYTES # BLD AUTO: 22.5 % — SIGNIFICANT CHANGE UP (ref 13–44)
MAGNESIUM SERPL-MCNC: 2.2 MG/DL — SIGNIFICANT CHANGE UP (ref 1.6–2.6)
MCHC RBC-ENTMCNC: 28.6 PG — SIGNIFICANT CHANGE UP (ref 27–34)
MCHC RBC-ENTMCNC: 33.3 GM/DL — SIGNIFICANT CHANGE UP (ref 32–36)
MCV RBC AUTO: 86.1 FL — SIGNIFICANT CHANGE UP (ref 80–100)
MONOCYTES # BLD AUTO: 0.53 K/UL — SIGNIFICANT CHANGE UP (ref 0–0.9)
MONOCYTES NFR BLD AUTO: 6.5 % — SIGNIFICANT CHANGE UP (ref 2–14)
NEUTROPHILS # BLD AUTO: 5.55 K/UL — SIGNIFICANT CHANGE UP (ref 1.8–7.4)
NEUTROPHILS NFR BLD AUTO: 68.4 % — SIGNIFICANT CHANGE UP (ref 43–77)
NRBC # BLD: 0 /100 WBCS — SIGNIFICANT CHANGE UP (ref 0–0)
NT-PROBNP SERPL-SCNC: 979 PG/ML — HIGH (ref 0–300)
PHOSPHATE SERPL-MCNC: 3.9 MG/DL — SIGNIFICANT CHANGE UP (ref 2.5–4.5)
PLATELET # BLD AUTO: 271 K/UL — SIGNIFICANT CHANGE UP (ref 150–400)
POTASSIUM SERPL-MCNC: 3.9 MMOL/L — SIGNIFICANT CHANGE UP (ref 3.5–5.3)
POTASSIUM SERPL-SCNC: 3.9 MMOL/L — SIGNIFICANT CHANGE UP (ref 3.5–5.3)
PROT SERPL-MCNC: 7.4 G/DL — SIGNIFICANT CHANGE UP (ref 6–8.3)
PROTHROM AB SERPL-ACNC: 11 SEC — SIGNIFICANT CHANGE UP (ref 9.5–13)
RBC # BLD: 4.96 M/UL — SIGNIFICANT CHANGE UP (ref 3.8–5.2)
RBC # FLD: 12.9 % — SIGNIFICANT CHANGE UP (ref 10.3–14.5)
SODIUM SERPL-SCNC: 142 MMOL/L — SIGNIFICANT CHANGE UP (ref 135–145)
TROPONIN T, HIGH SENSITIVITY RESULT: 70 NG/L — HIGH (ref 0–51)
TROPONIN T, HIGH SENSITIVITY RESULT: 84 NG/L — HIGH (ref 0–51)
WBC # BLD: 8.13 K/UL — SIGNIFICANT CHANGE UP (ref 3.8–10.5)
WBC # FLD AUTO: 8.13 K/UL — SIGNIFICANT CHANGE UP (ref 3.8–10.5)

## 2024-04-23 PROCEDURE — 71045 X-RAY EXAM CHEST 1 VIEW: CPT | Mod: 26

## 2024-04-23 PROCEDURE — 99285 EMERGENCY DEPT VISIT HI MDM: CPT

## 2024-04-23 RX ORDER — ROSUVASTATIN CALCIUM 5 MG/1
1 TABLET ORAL
Refills: 0 | DISCHARGE

## 2024-04-23 RX ORDER — TICAGRELOR 90 MG/1
180 TABLET ORAL ONCE
Refills: 0 | Status: COMPLETED | OUTPATIENT
Start: 2024-04-23 | End: 2024-04-23

## 2024-04-23 RX ORDER — ASPIRIN/CALCIUM CARB/MAGNESIUM 324 MG
81 TABLET ORAL DAILY
Refills: 0 | Status: DISCONTINUED | OUTPATIENT
Start: 2024-04-23 | End: 2024-04-24

## 2024-04-23 RX ORDER — AMLODIPINE BESYLATE 2.5 MG/1
5 TABLET ORAL DAILY
Refills: 0 | Status: DISCONTINUED | OUTPATIENT
Start: 2024-04-23 | End: 2024-04-24

## 2024-04-23 RX ORDER — LEVOTHYROXINE SODIUM 125 MCG
1 TABLET ORAL
Qty: 0 | Refills: 0 | DISCHARGE

## 2024-04-23 RX ORDER — HEPARIN SODIUM 5000 [USP'U]/ML
5000 INJECTION INTRAVENOUS; SUBCUTANEOUS EVERY 12 HOURS
Refills: 0 | Status: DISCONTINUED | OUTPATIENT
Start: 2024-04-23 | End: 2024-04-23

## 2024-04-23 RX ORDER — HEPARIN SODIUM 5000 [USP'U]/ML
INJECTION INTRAVENOUS; SUBCUTANEOUS
Qty: 25000 | Refills: 0 | Status: DISCONTINUED | OUTPATIENT
Start: 2024-04-23 | End: 2024-04-24

## 2024-04-23 RX ORDER — LEVOTHYROXINE SODIUM 125 MCG
88 TABLET ORAL DAILY
Refills: 0 | Status: DISCONTINUED | OUTPATIENT
Start: 2024-04-23 | End: 2024-04-24

## 2024-04-23 RX ORDER — ASPIRIN/CALCIUM CARB/MAGNESIUM 324 MG
325 TABLET ORAL DAILY
Refills: 0 | Status: DISCONTINUED | OUTPATIENT
Start: 2024-04-23 | End: 2024-04-23

## 2024-04-23 RX ORDER — HEPARIN SODIUM 5000 [USP'U]/ML
4400 INJECTION INTRAVENOUS; SUBCUTANEOUS EVERY 6 HOURS
Refills: 0 | Status: DISCONTINUED | OUTPATIENT
Start: 2024-04-23 | End: 2024-04-24

## 2024-04-23 RX ORDER — PANTOPRAZOLE SODIUM 20 MG/1
40 TABLET, DELAYED RELEASE ORAL
Refills: 0 | Status: DISCONTINUED | OUTPATIENT
Start: 2024-04-23 | End: 2024-04-24

## 2024-04-23 RX ORDER — LEVOTHYROXINE SODIUM 125 MCG
1 TABLET ORAL
Refills: 0 | DISCHARGE

## 2024-04-23 RX ORDER — ATORVASTATIN CALCIUM 80 MG/1
80 TABLET, FILM COATED ORAL AT BEDTIME
Refills: 0 | Status: DISCONTINUED | OUTPATIENT
Start: 2024-04-23 | End: 2024-04-24

## 2024-04-23 RX ADMIN — TICAGRELOR 180 MILLIGRAM(S): 90 TABLET ORAL at 22:49

## 2024-04-23 RX ADMIN — HEPARIN SODIUM 900 UNIT(S)/HR: 5000 INJECTION INTRAVENOUS; SUBCUTANEOUS at 22:50

## 2024-04-23 RX ADMIN — ATORVASTATIN CALCIUM 80 MILLIGRAM(S): 80 TABLET, FILM COATED ORAL at 22:49

## 2024-04-23 NOTE — H&P ADULT - HISTORY OF PRESENT ILLNESS
3 63-year-old female history HTN, HLD, hypothyroidism presents with chest pain.  Patient brought in by ambulance reports episode of chest pain today while walking down the block described as midsternal radiating to both arms with 1 episode of nausea and vomiting.  reports several episodes with similar symptoms in the last week, 1 previous episode while on an exercise bike.  Reports history of CAD but no stents.  Saw her cardiologist this week had normal EKG, was scheduled for echo tomorrow and stress test later this week.  Patient was given 3 baby aspirin by EMS.  Chest pain-free at this time.  Denies associated SOB, dizziness, LOC, back pain, fever, chills, cough.

## 2024-04-23 NOTE — ED PROVIDER NOTE - CLINICAL SUMMARY MEDICAL DECISION MAKING FREE TEXT BOX
Adult female pmh of coronary artery disease pw cp/nv. EKg baseline but trop elevated w normal creat. Consistent w Nstemi. Seen by card tba for cath in am  Manuel Rosario MD, Facep

## 2024-04-23 NOTE — ED PROVIDER NOTE - RAPID ASSESSMENT
63-year-old female history HTN, HLD, hypothyroidism presents with chest pain.  Patient brought in by ambulance reports episode of chest pain today while walking down the block described as midsternal radiating to both arms with 1 episode of nausea and vomiting.  Orts several episodes with similar symptoms in the last week, 1 previous episode while on an exercise bike.  Reports history of CAD but no stents.  Saw her cardiologist this week had normal EKG, was scheduled for echo tomorrow and stress test later this week.  Patient was given 3 baby aspirin by EMS.  Chest pain-free at this time.  Denies associated SOB, dizziness, LOC, back pain, fever, chills, cough.    well appearing, no respiratory distress    IBunny PA-C saw patient as a rapid assessment initially in triage. The rest of care to be performed by the primary ED team. Receiving team will follow up on labs, analgesia, any clinical imaging, and perform reassessment and disposition of the patient as clinically indicated. All decisions regarding the progression of care will be made at their discretion. 63-year-old female history HTN, HLD, hypothyroidism presents with chest pain.  Patient brought in by ambulance reports episode of chest pain today while walking down the block described as midsternal radiating to both arms with 1 episode of nausea and vomiting.  reports several episodes with similar symptoms in the last week, 1 previous episode while on an exercise bike.  Reports history of CAD but no stents.  Saw her cardiologist this week had normal EKG, was scheduled for echo tomorrow and stress test later this week.  Patient was given 3 baby aspirin by EMS.  Chest pain-free at this time.  Denies associated SOB, dizziness, LOC, back pain, fever, chills, cough.    well appearing, no respiratory distress    IBunny PA-C saw patient as a rapid assessment initially in triage. The rest of care to be performed by the primary ED team. Receiving team will follow up on labs, analgesia, any clinical imaging, and perform reassessment and disposition of the patient as clinically indicated. All decisions regarding the progression of care will be made at their discretion.

## 2024-04-23 NOTE — H&P ADULT - ASSESSMENT
A/P     Chest pain r/o ACS   serial CE   seen by cardiology fellow   awaiting 2nd set of CE : will hold off on brilinta / Heparin GTT unless CE are pos   cardio will follow : for ischemic eval   she had Cardiac cath in 2022 : shows 30% LAD lesion  c/w asa       # HTN :   c/w amlodipine 5 mg daily     # Hx of Thyroid ca s/p resection / now hypothyroidism   -c/w synthroid 88 mcg   check TSH     # HLD :   will start lipitor 80 mg q HS  she was on crestor 20 mg at home     advance care planning : d/w patient regarding advance directive. d/w her regarding intubation / CPR if need arise. Agrees for everything. remain full code

## 2024-04-23 NOTE — ED ADULT NURSE NOTE - OBJECTIVE STATEMENT
Patient is alert  and oriented x4. Color is good  and  skin warm to touch.  She  is  c/o chest and mid abdominal pain. She  had  nausea this morning.  No  SOB or  diaphoresis noted.

## 2024-04-23 NOTE — ED PROVIDER NOTE - NSICDXPASTSURGICALHX_GEN_ALL_CORE_FT
PAST SURGICAL HISTORY:  Impacted [place]  Tooth 1980    S/P Carpal Tunnel Release right - 2000    S/P Left Oophorectomy 1981    S/P thyroidectomy

## 2024-04-23 NOTE — ED PROVIDER NOTE - PHYSICAL EXAMINATION
WDWN female awake alert normocephalic atraumatic neck supple chest clear anterior & posterior cv no rubs, gallops or murmurs abd soft +bs no mass guarding rebound, Neuro gcs 15 speech fluent power 5/5 all ext  Manuel Rosario MD, Facep

## 2024-04-23 NOTE — ED PROVIDER NOTE - OBJECTIVE STATEMENT
Patient is a 63y F PMHx HTN, HLD, hypothyroidism, p/w chest pain. Intermittent. Occurs at rest and with exertion. Mainly on the right side, sometimes epigastric with radiation bilaterally. Patient states she has had a stress test, last 1 year ago, no stents in the past. Patient took 3 ASA today with EMS. Denies fevers, cough, syncope, numbness.

## 2024-04-23 NOTE — H&P ADULT - NSHPPHYSICALEXAM_GEN_ALL_CORE
Vital Signs Last 24 Hrs  T(C): 36.8 (23 Apr 2024 19:57), Max: 36.8 (23 Apr 2024 19:57)  T(F): 98.2 (23 Apr 2024 19:57), Max: 98.2 (23 Apr 2024 19:57)  HR: 67 (23 Apr 2024 19:57) (67 - 74)  BP: 131/74 (23 Apr 2024 19:57) (111/60 - 131/74)  BP(mean): 91 (23 Apr 2024 19:57) (91 - 91)  RR: 17 (23 Apr 2024 19:57) (17 - 19)  SpO2: 98% (23 Apr 2024 19:57) (97% - 98%)    Parameters below as of 23 Apr 2024 19:57  Patient On (Oxygen Delivery Method): room air    heent : nc/at   neck : supple, no JVD  Lungs : B/L clear , no w/r/r  heart: s1s2 nml  abd : soft, NABS, NT/ND  ext : no e/c/c, pulses 2 +  neuro: aaox3 , no focal deficit

## 2024-04-23 NOTE — H&P ADULT - NSHPLABSRESULTS_GEN_ALL_CORE
14.2   8.13  )-----------( 271      ( 23 Apr 2024 17:04 )             42.7   04-23    142  |  104  |  16  ----------------------------<  104<H>  3.9   |  24  |  0.97    Ca    9.9      23 Apr 2024 17:04  Phos  3.9     04-23  Mg     2.2     04-23    TPro  7.4  /  Alb  4.7  /  TBili  0.2  /  DBili  x   /  AST  26  /  ALT  26  /  AlkPhos  78  04-23

## 2024-04-23 NOTE — H&P ADULT - NSICDXPASTSURGICALHX_GEN_ALL_CORE_FT
PAST SURGICAL HISTORY:  Impacted [place]  Tooth 1980    S/P Carpal Tunnel Release right - 2000    S/P Left Oophorectomy 1981    S/P thyroidectomy

## 2024-04-23 NOTE — ED PROVIDER NOTE - PROGRESS NOTE DETAILS
Discussed with Dr Briggs NP req admit Dr Elaine Rosario MD, Facep Discussed with Dr Harrell Referred to Dr Hedrick  Discussed with DR Hedrick TBA  Manuel Rosario MD, Facep

## 2024-04-23 NOTE — CONSULT NOTE ADULT - CONSULT REQUESTED BY NAME
ED

## 2024-04-23 NOTE — CONSULT NOTE ADULT - SUBJECTIVE AND OBJECTIVE BOX
Cardiology Consult Note   [Please check [email or web address] password: [email or web address]" for cardiology service schedule and contact information]    HPI:  63F w/ pmhx of HTN, HLD, thyroid ca s/p resection, CAD (30% LAD, 2022) presenting w/ chest pain.  Patient follows w/ Dr Pete Briggs- last seen in the office last week.  Per patient, was planned for outpatient TTE and stress test for exertional chest pain for past week.  Today had additional episode of exertional chest pain while walking associated w/ nausea/vomiting for which she came to the ED for evaluation.    ECG similar to previous (LBBB, negative sgarbossa criteria)  Trop T 70, .  Per report loaded w/ ASA by EMS.  Currently chest pain free.    [facility] in 2022 w/ Dr Tobar w/ 30% LAD lesion, no intervention.  Previous TTE not available for review.      PAST MEDICAL & SURGICAL HISTORY:  Malignant Neoplasm of Thyroid Gland      HLD (hyperlipidemia)      S/P Left Oophorectomy  1981      S/P Carpal Tunnel Release  right - 2000      Impacted [place]  Tooth  1980      S/P thyroidectomy        FAMILY HISTORY:  No pertinent family history in first degree relatives      SOCIAL HISTORY:  unchanged    MEDICATIONS:                  REVIEW OF SYSTEMS:  CV: chest pain (-), palpitation (-), orthopnea (-), PND (-), edema (-)  PULM: SOB (-), cough (-), wheezing (-), hemoptysis (-).   CONST: fever (-), chills (-) or fatigue (-)  GI: abdominal distension (-), abdominal pain (-) , nausea/vomiting (-), hematemesis, (-), melena (-), hematochezia (-)  : dysuria (-), frequency (-), hematuria (-).   NEURO: numbness (-), weakness (-), dizziness (-)  SKIN: itching (-), rash (-)  HEENT:  visual changes (-); vertigo or throat pain (-);  neck stiffness (-)     All other review of systems is negative unless indicated above.   -------------------------------------------------------------------------------------------  PHYSICAL EXAM:  T(C): 36.7 (04-23-24 @ 16:17), Max: 36.7 (04-23-24 @ 16:17)  HR: 74 (04-23-24 @ 16:17) (74 - 74)  BP: 130/71 (04-23-24 @ 16:17) (130/71 - 130/71)  RR: 18 (04-23-24 @ 16:17) (18 - 18)  SpO2: 97% (04-23-24 @ 16:17) (97% - 97%)  Wt(kg): --  I&O's Summary      GENERAL: NAD  HEAD: Atraumatic, Normocephalic.  EYES: EOMI, PERRLA, conjunctiva and sclera clear.  ENT: Moist mucous membranes.  NECK: Supple, No JVD.  CHEST/LUNG: Clear to auscultation bilaterally; No rales, rhonchi, wheezing, or rubs. Unlabored respirations.  HEART: Regular rate and rhythm; No murmurs, rubs, or gallops.  ABDOMEN: Bowel sounds present; Soft, Nontender, Nondistended.   EXTREMITIES:  2+ Peripheral Pulses, brisk capillary refill. No clubbing, cyanosis, or edema.  PSYCH: Normal affect.  SKIN: No rashes or lesions.    -------------------------------------------------------------------------------------------  LABS:                          14.2   8.13  )-----------( 271      ( 23 Apr 2024 17:04 )             42.7     04-23    142  |  104  |  16  ----------------------------<  104<H>  3.9   |  24  |  0.97    Ca    9.9      23 Apr 2024 17:04  Phos  3.9     04-23  Mg     2.2     04-23    TPro  7.4  /  Alb  4.7  /  TBili  0.2  /  DBili  x   /  AST  26  /  ALT  26  /  AlkPhos  78  04-23    PT/INR - ( 23 Apr 2024 17:04 )   PT: 11.0 sec;   INR: 1.00 ratio         PTT - ( 23 Apr 2024 17:04 )  PTT:34.1 sec  CARDIAC MARKERS ( 23 Apr 2024 17:04 )  70 ng/L / x     / x     / x     / x     / x                           Cardiology Consult Note   [Please check [email or web address] password: "[email or web address]" for cardiology service schedule and contact information]    HPI:  63F w/ pmhx of HTN, HLD, thyroid ca s/p resection, CAD (30% LAD, 2022) presenting w/ chest pain.  Patient follows w/ Dr Pete Briggs- last seen in the office last week.  Per patient, was planned for outpatient TTE and stress test for exertional chest pain for past week.  Today had additional episode of exertional chest pain while walking associated w/ nausea/vomiting for which she came to the ED for evaluation.    ECG similar to previous (LBBB, negative sgarbossa criteria)  Trop T 70, CKMB 2.4, .  Per report loaded w/ ASA by EMS.  Currently chest pain free.    [facility] in [facility] w/ Dr Tobar w/ 30% LAD lesion, no intervention.  Previous TTE not available for review.      PAST MEDICAL & SURGICAL HISTORY:  Malignant Neoplasm of Thyroid Gland      HLD (hyperlipidemia)      S/P Left Oophorectomy  1981      S/P Carpal Tunnel Release  right - 2000      Impacted [place]  Tooth  1980      S/P thyroidectomy        FAMILY HISTORY:  No pertinent family history in first degree relatives      SOCIAL HISTORY:  unchanged    MEDICATIONS:                  REVIEW OF SYSTEMS:  CV: chest pain (-), palpitation (-), orthopnea (-), PND (-), edema (-)  PULM: SOB (-), cough (-), wheezing (-), hemoptysis (-).   CONST: fever (-), chills (-) or fatigue (-)  GI: abdominal distension (-), abdominal pain (-) , nausea/vomiting (-), hematemesis, (-), melena (-), hematochezia (-)  : dysuria (-), frequency (-), hematuria (-).   NEURO: numbness (-), weakness (-), dizziness (-)  SKIN: itching (-), rash (-)  HEENT:  visual changes (-); vertigo or throat pain (-);  neck stiffness (-)     All other review of systems is negative unless indicated above.   -------------------------------------------------------------------------------------------  PHYSICAL EXAM:  T(C): 36.7 (04-23-24 @ 16:17), Max: 36.7 (04-23-24 @ 16:17)  HR: 74 (04-23-24 @ 16:17) (74 - 74)  BP: 130/71 (04-23-24 @ 16:17) (130/71 - 130/71)  RR: 18 (04-23-24 @ 16:17) (18 - 18)  SpO2: 97% (04-23-24 @ 16:17) (97% - 97%)  Wt(kg): --  I&O's Summary      GENERAL: NAD  HEAD: Atraumatic, Normocephalic.  EYES: EOMI, PERRLA, conjunctiva and sclera clear.  ENT: Moist mucous membranes.  NECK: Supple, No JVD.  CHEST/LUNG: Clear to auscultation bilaterally; No rales, rhonchi, wheezing, or rubs. Unlabored respirations.  HEART: Regular rate and rhythm; No murmurs, rubs, or gallops.  ABDOMEN: Bowel sounds present; Soft, Nontender, Nondistended.   EXTREMITIES:  2+ Peripheral Pulses, brisk capillary refill. No clubbing, cyanosis, or edema.  PSYCH: Normal affect.  SKIN: No rashes or lesions.    -------------------------------------------------------------------------------------------  LABS:                          14.2   8.13  )-----------( 271      ( 23 Apr 2024 17:04 )             42.7     04-23    142  |  104  |  16  ----------------------------<  104<H>  3.9   |  24  |  0.97    Ca    9.9      23 Apr 2024 17:04  Phos  3.9     04-23  Mg     2.2     04-23    TPro  7.4  /  Alb  4.7  /  TBili  0.2  /  DBili  x   /  AST  26  /  ALT  26  /  AlkPhos  78  04-23    PT/INR - ( 23 Apr 2024 17:04 )   PT: 11.0 sec;   INR: 1.00 ratio         PTT - ( 23 Apr 2024 17:04 )  PTT:34.1 sec  CARDIAC MARKERS ( 23 Apr 2024 17:04 )  70 ng/L / x     / x     / x     / x     / x

## 2024-04-23 NOTE — CONSULT NOTE ADULT - ASSESSMENT
63F w/ pmhx of HTN, HLD, thyroid ca s/p resection, CAD (30% LAD, 2022) presenting w/ chest pain.    Impression:  ECG similar to previous.  Currently chest pain free, hemodynamically stable.  Initial Trop 70, .  [facility] 2022 w/ 30% LAD lesion, no intervention.    Recommendations:  - please add on CKMB to initial set of labs, trend Trop/CKMB  - TTE in AM  - Tentative plan for [facility] tomorrow AM  - c/w ASA 81mg qd  - high intensity statin  - Tele, K>4 Mg>2    Ashutosh Cary MD  PGY-4, Cardiology Fellow    Please check [email or web address] password: [email or web address]" for cardiology service schedule and contact information.   *Please note that all recommendations are incomplete until attending attestation* 63F w/ pmhx of HTN, HLD, thyroid ca s/p resection, CAD (30% LAD, 2022) presenting w/ chest pain.    Impression:  ECG similar to previous.  Currently chest pain free, hemodynamically stable.  Initial Trop 70, .  Initial CKMB negative (2.4)  [facility] 2022 w/ 30% LAD lesion, no intervention.    Recommendations:  - please trend Trop/CKMB  -- given normal initial CKMB, will defer Brillinta/heparin load until second set of enzymes result  - TTE in AM  - Ischemic workup pending above  - c/w ASA 81mg qd  - high intensity statin  - Tele, K>4 Mg>2    Ashutosh Cary MD  PGY-4, Cardiology Fellow    Please check [email or web address] password: [email or web address]" for cardiology service schedule and contact information.   *Please note that all recommendations are incomplete until attending attestation* 63F w/ pmhx of HTN, HLD, thyroid ca s/p resection, CAD (30% LAD, 2022) presenting w/ chest pain.    Impression:  ECG similar to previous.  Currently chest pain free, hemodynamically stable.  Initial Trop 70, .  Initial CKMB negative (2.4)  [facility] 2022 w/ 30% LAD lesion, no intervention.    Recommendations:  - please trend Trop/CKMB  -- given normal initial CKMB, will defer Brillinta/heparin load until second set of enzymes result  - TTE in AM  - Ischemic workup pending above  - c/w ASA 81mg qd  - high intensity statin  - Tele, K>4 Mg>2      ADDENDUM:  Repeat cardiac enzymes after 90 min: trop 70-84, CKMB 2.4-2.5  - Although CKMB without significant change, troponin with mild increase in the setting of chest pain of increased frequency recently  - Would recommend loading with brillinta 180mg and heparin gtt now for possible unstable angina  - Please continue ASA 81mg qd, and Brilinta 90mg BID starting tomorrow AM      Ashutosh Cary MD  PGY-4, Cardiology Fellow    Please check [email or web address] password: "[email or web address]" for cardiology service schedule and contact information.   *Please note that all recommendations are incomplete until attending attestation*

## 2024-04-23 NOTE — ED ADULT NURSE NOTE - NSFALLUNIVINTERV_ED_ALL_ED
Bed/Stretcher in lowest position, wheels locked, appropriate side rails in place/Call bell, personal items and telephone in reach/Instruct patient to call for assistance before getting out of bed/chair/stretcher/Non-slip footwear applied when patient is off stretcher/[place] to call system/Physically safe environment - no spills, clutter or unnecessary equipment/Purposeful proactive rounding/Room/bathroom lighting operational, light cord in reach

## 2024-04-23 NOTE — ED PROVIDER NOTE - ATTENDING CONTRIBUTION TO CARE
Private Physician Lamont Franz, pcp, Bhupinder, Brigitte,   63yf pmh HLD, Thyroids Ca sp resectoin,   58 y/o female daily ETOH dependence (drinks wine 1-2 glasses per day), HLD, Malignant Neoplasm of Thyroid s/p Thyroidectomy on Synthroid followed by PCP Dr. DENVER Johnson and Cardiologist Dr. Pete Briggs reports having a 1 year history of exertional SOB when doing heavy lifting or walking up more than 3 flights of stairs.  She is s/p NST (results not on chart) as per RX from Dr. Briggs's office is abnormal.  Pt presents for [facility] for further evaluation.  She denies cp, sob, palpitations or implantable devices.  She is fully vaccinated and boosted with Pfizer.  Her COVID PCR is negative on 4/4/22 Private Physician Lamont Franz, pcp, Kayla, Brigitte,   63yf pmh HLD, Thyroids Ca sp resection, HTN, NO dm,cva, Cad, Had cath w/o angioplasty/stent few years ago. No habits. PT comes to ed c/o CP off/on past two weeks. Seen by kayla last week. Planning stress/echo. Yesterday pain worsened with ambulation, today worsened and was assocated w nausea and vomiting, no diaphoresis, dizziness, shortness of breath, abd pain. palps. No cp at present. Private Physician Lamont Franz, pcp, Kayla, Brigitte,   63yf pmh HLD, Thyroids Ca sp resection, HTN, NO dm,cva, Cad, Had cath w/o angioplasty/stent few years ago. No habits. PT comes to ed c/o CP off/on past two weeks. Seen by kayla last week. Planning stress/echo. Yesterday pain worsened with ambulation, today worsened and was assocated w nausea and vomiting, no diaphoresis, dizziness, shortness of breath, abd pain. palps. No cp at present. Had full asa enroute. PE WDWN feamle awake alert normocephalic atraumatic neck supple chest clear anterior & posterior cv no rubs, gallops or murmurs abd soft +bs no mass guarding rebound, Neuro gcs 15 speech fluent power 5/5 all ext  Manuel Rosario MD, Facep Private Physician Lamont Franz, pcp, Kayla, Brigitte,   63yf pmh HLD, Thyroids Ca sp resection, HTN, NO dm,cva, Cad, Had cath w/o angioplasty/stent few years ago. No habits. PT comes to ed c/o CP off/on past two weeks. Seen by kayla last week. Planning stress/echo. Yesterday pain worsened with ambulation, today worsened and was assocated w nausea and vomiting, no diaphoresis, dizziness, shortness of breath, abd pain. palps. No cp at present. Had full asa enroute. PE WDWN female awake alert normocephalic atraumatic neck supple chest clear anterior & posterior cv no rubs, gallops or murmurs abd soft +bs no mass guarding rebound, Neuro gcs 15 speech fluent power 5/5 all ext  Manuel Rosario MD, Facep

## 2024-04-23 NOTE — PROVIDER CONTACT NOTE (OTHER) - ASSESSMENT
pt a/ox4; VSS: pt endorses 2/10 chest pressure midsternal chest, non radiating. denies SOB, Palpitations, lightheadedness, dizziness, or nausea.

## 2024-04-23 NOTE — PATIENT PROFILE ADULT - FALL HARM RISK - UNIVERSAL INTERVENTIONS
Bed in lowest position, wheels locked, appropriate side rails in place/Call bell, personal items and telephone in reach/Instruct patient to call for assistance before getting out of bed or chair/Non-slip footwear when patient is out of bed/[place] to call system/Physically safe environment - no spills, clutter or unnecessary equipment/Purposeful Proactive Rounding/Room/bathroom lighting operational, light cord in reach

## 2024-04-23 NOTE — ED ADULT NURSE NOTE - NSICDXPASTSURGICALHX_GEN_ALL_CORE_FT
PAST SURGICAL HISTORY:  Impacted [place]  Tooth 1980    S/P Carpal Tunnel Release right - 2000    S/P Left Oophorectomy 1981    S/P thyroidectomy

## 2024-04-23 NOTE — ED ADULT NURSE REASSESSMENT NOTE - NS ED NURSE REASSESS COMMENT FT1
Patient found in stretcher breathing spontaneously and unlabored on Room Air. No signs of respiratory distress @ this time. The patient is alert and orientated x4 and responds appropriately. The patient presents with a Left Hand 20G PIV that is C/D/I and saline locked @ this time. Pt denies chest pain, palpitations, shortness of breath, headache, visual disturbances, numbness/tingling, fever, chills, diaphoresis,  nausea, vomiting, constipation, diarrhea, or urinary symptoms. Safety and comfort measures provided, bed locked and in lowest position, side rails up for safety. VS to order and Awaiting MD Reassessment.

## 2024-04-24 ENCOUNTER — TRANSCRIPTION ENCOUNTER (OUTPATIENT)
Age: 64
End: 2024-04-24

## 2024-04-24 ENCOUNTER — RESULT REVIEW (OUTPATIENT)
Age: 64
End: 2024-04-24

## 2024-04-24 VITALS
SYSTOLIC BLOOD PRESSURE: 118 MMHG | HEART RATE: 70 BPM | OXYGEN SATURATION: 95 % | RESPIRATION RATE: 15 BRPM | DIASTOLIC BLOOD PRESSURE: 56 MMHG

## 2024-04-24 LAB
ANION GAP SERPL CALC-SCNC: 13 MMOL/L — SIGNIFICANT CHANGE UP (ref 5–17)
APTT BLD: 77.6 SEC — HIGH (ref 24.5–35.6)
BUN SERPL-MCNC: 14 MG/DL — SIGNIFICANT CHANGE UP (ref 7–23)
CALCIUM SERPL-MCNC: 9.3 MG/DL — SIGNIFICANT CHANGE UP (ref 8.4–10.5)
CHLORIDE SERPL-SCNC: 104 MMOL/L — SIGNIFICANT CHANGE UP (ref 96–108)
CHOLEST SERPL-MCNC: 142 MG/DL — SIGNIFICANT CHANGE UP
CK MB CFR SERPL CALC: 2.9 NG/ML — SIGNIFICANT CHANGE UP (ref 0–3.8)
CK SERPL-CCNC: 68 U/L — SIGNIFICANT CHANGE UP (ref 25–170)
CO2 SERPL-SCNC: 23 MMOL/L — SIGNIFICANT CHANGE UP (ref 22–31)
CREAT SERPL-MCNC: 0.72 MG/DL — SIGNIFICANT CHANGE UP (ref 0.5–1.3)
EGFR: 92 ML/MIN/1.73M2 — SIGNIFICANT CHANGE UP
GLUCOSE SERPL-MCNC: 94 MG/DL — SIGNIFICANT CHANGE UP (ref 70–99)
HCT VFR BLD CALC: 40.8 % — SIGNIFICANT CHANGE UP (ref 34.5–45)
HDLC SERPL-MCNC: 46 MG/DL — LOW
HGB BLD-MCNC: 13.8 G/DL — SIGNIFICANT CHANGE UP (ref 11.5–15.5)
LIPID PNL WITH DIRECT LDL SERPL: 81 MG/DL — SIGNIFICANT CHANGE UP
MCHC RBC-ENTMCNC: 29.2 PG — SIGNIFICANT CHANGE UP (ref 27–34)
MCHC RBC-ENTMCNC: 33.8 GM/DL — SIGNIFICANT CHANGE UP (ref 32–36)
MCV RBC AUTO: 86.4 FL — SIGNIFICANT CHANGE UP (ref 80–100)
NON HDL CHOLESTEROL: 96 MG/DL — SIGNIFICANT CHANGE UP
NRBC # BLD: 0 /100 WBCS — SIGNIFICANT CHANGE UP (ref 0–0)
PLATELET # BLD AUTO: 263 K/UL — SIGNIFICANT CHANGE UP (ref 150–400)
POTASSIUM SERPL-MCNC: 3.5 MMOL/L — SIGNIFICANT CHANGE UP (ref 3.5–5.3)
POTASSIUM SERPL-SCNC: 3.5 MMOL/L — SIGNIFICANT CHANGE UP (ref 3.5–5.3)
RBC # BLD: 4.72 M/UL — SIGNIFICANT CHANGE UP (ref 3.8–5.2)
RBC # FLD: 12.9 % — SIGNIFICANT CHANGE UP (ref 10.3–14.5)
SODIUM SERPL-SCNC: 140 MMOL/L — SIGNIFICANT CHANGE UP (ref 135–145)
TRIGL SERPL-MCNC: 74 MG/DL — SIGNIFICANT CHANGE UP
TROPONIN T, HIGH SENSITIVITY RESULT: 72 NG/L — HIGH (ref 0–51)
TSH SERPL-MCNC: 0.47 UIU/ML — SIGNIFICANT CHANGE UP (ref 0.27–4.2)
WBC # BLD: 7.26 K/UL — SIGNIFICANT CHANGE UP (ref 3.8–10.5)
WBC # FLD AUTO: 7.26 K/UL — SIGNIFICANT CHANGE UP (ref 3.8–10.5)

## 2024-04-24 PROCEDURE — 83690 ASSAY OF LIPASE: CPT

## 2024-04-24 PROCEDURE — C1725: CPT

## 2024-04-24 PROCEDURE — 84484 ASSAY OF TROPONIN QUANT: CPT

## 2024-04-24 PROCEDURE — C1874: CPT

## 2024-04-24 PROCEDURE — 85730 THROMBOPLASTIN TIME PARTIAL: CPT

## 2024-04-24 PROCEDURE — C8929: CPT

## 2024-04-24 PROCEDURE — 84443 ASSAY THYROID STIM HORMONE: CPT

## 2024-04-24 PROCEDURE — 80061 LIPID PANEL: CPT

## 2024-04-24 PROCEDURE — 99223 1ST HOSP IP/OBS HIGH 75: CPT

## 2024-04-24 PROCEDURE — C1894: CPT

## 2024-04-24 PROCEDURE — 84100 ASSAY OF PHOSPHORUS: CPT

## 2024-04-24 PROCEDURE — 82550 ASSAY OF CK (CPK): CPT

## 2024-04-24 PROCEDURE — 85027 COMPLETE CBC AUTOMATED: CPT

## 2024-04-24 PROCEDURE — C1753: CPT

## 2024-04-24 PROCEDURE — 83735 ASSAY OF MAGNESIUM: CPT

## 2024-04-24 PROCEDURE — 99285 EMERGENCY DEPT VISIT HI MDM: CPT | Mod: 25

## 2024-04-24 PROCEDURE — C9600: CPT | Mod: LD

## 2024-04-24 PROCEDURE — C1769: CPT

## 2024-04-24 PROCEDURE — 93454 CORONARY ARTERY ANGIO S&I: CPT | Mod: 59

## 2024-04-24 PROCEDURE — 85025 COMPLETE CBC W/AUTO DIFF WBC: CPT

## 2024-04-24 PROCEDURE — 93306 TTE W/DOPPLER COMPLETE: CPT | Mod: 26

## 2024-04-24 PROCEDURE — 80048 BASIC METABOLIC PNL TOTAL CA: CPT

## 2024-04-24 PROCEDURE — C1887: CPT

## 2024-04-24 PROCEDURE — 93356 MYOCRD STRAIN IMG SPCKL TRCK: CPT

## 2024-04-24 PROCEDURE — 93005 ELECTROCARDIOGRAM TRACING: CPT

## 2024-04-24 PROCEDURE — 80053 COMPREHEN METABOLIC PANEL: CPT

## 2024-04-24 PROCEDURE — 92978 ENDOLUMINL IVUS OCT C 1ST: CPT | Mod: LD

## 2024-04-24 PROCEDURE — 82553 CREATINE MB FRACTION: CPT

## 2024-04-24 PROCEDURE — 83880 ASSAY OF NATRIURETIC PEPTIDE: CPT

## 2024-04-24 PROCEDURE — 85610 PROTHROMBIN TIME: CPT

## 2024-04-24 PROCEDURE — 71045 X-RAY EXAM CHEST 1 VIEW: CPT

## 2024-04-24 PROCEDURE — 93010 ELECTROCARDIOGRAM REPORT: CPT

## 2024-04-24 RX ORDER — ASPIRIN/CALCIUM CARB/MAGNESIUM 324 MG
1 TABLET ORAL
Qty: 90 | Refills: 3
Start: 2024-04-24

## 2024-04-24 RX ORDER — TICAGRELOR 90 MG/1
1 TABLET ORAL
Qty: 180 | Refills: 11
Start: 2024-04-24

## 2024-04-24 RX ORDER — AMLODIPINE BESYLATE 2.5 MG/1
1 TABLET ORAL
Refills: 0 | DISCHARGE

## 2024-04-24 RX ORDER — SODIUM CHLORIDE 9 MG/ML
250 INJECTION INTRAMUSCULAR; INTRAVENOUS; SUBCUTANEOUS
Refills: 0 | Status: COMPLETED | OUTPATIENT
Start: 2024-04-24 | End: 2024-04-24

## 2024-04-24 RX ORDER — SODIUM CHLORIDE 9 MG/ML
250 INJECTION INTRAMUSCULAR; INTRAVENOUS; SUBCUTANEOUS ONCE
Refills: 0 | Status: COMPLETED | OUTPATIENT
Start: 2024-04-24 | End: 2024-04-24

## 2024-04-24 RX ORDER — SODIUM CHLORIDE 9 MG/ML
1000 INJECTION INTRAMUSCULAR; INTRAVENOUS; SUBCUTANEOUS
Refills: 0 | Status: DISCONTINUED | OUTPATIENT
Start: 2024-04-24 | End: 2024-04-24

## 2024-04-24 RX ORDER — ASPIRIN/CALCIUM CARB/MAGNESIUM 324 MG
1 TABLET ORAL
Refills: 0 | DISCHARGE

## 2024-04-24 RX ORDER — METOPROLOL SUCCINATE 50 MG/1
1 TABLET, EXTENDED RELEASE ORAL
Qty: 30 | Refills: 1 | DISCHARGE
Start: 2024-04-24

## 2024-04-24 RX ORDER — ACETAMINOPHEN 500 MG
1000 TABLET ORAL ONCE
Refills: 0 | Status: COMPLETED | OUTPATIENT
Start: 2024-04-24 | End: 2024-04-24

## 2024-04-24 RX ORDER — POTASSIUM CHLORIDE 20 MEQ
40 PACKET (EA) ORAL ONCE
Refills: 0 | Status: COMPLETED | OUTPATIENT
Start: 2024-04-24 | End: 2024-04-24

## 2024-04-24 RX ORDER — METOPROLOL TARTRATE 50 MG
25 TABLET ORAL DAILY
Refills: 0 | Status: DISCONTINUED | OUTPATIENT
Start: 2024-04-24 | End: 2024-04-24

## 2024-04-24 RX ORDER — TICAGRELOR 90 MG/1
90 TABLET ORAL EVERY 12 HOURS
Refills: 0 | Status: DISCONTINUED | OUTPATIENT
Start: 2024-04-24 | End: 2024-04-24

## 2024-04-24 RX ORDER — TICAGRELOR 90 MG/1
1 TABLET ORAL
Qty: 60 | Refills: 0
Start: 2024-04-24

## 2024-04-24 RX ORDER — METOPROLOL TARTRATE 50 MG
1 TABLET ORAL
Qty: 30 | Refills: 1
Start: 2024-04-24

## 2024-04-24 RX ADMIN — AMLODIPINE BESYLATE 5 MILLIGRAM(S): 2.5 TABLET ORAL at 05:32

## 2024-04-24 RX ADMIN — HEPARIN SODIUM 800 UNIT(S)/HR: 5000 INJECTION INTRAVENOUS; SUBCUTANEOUS at 05:35

## 2024-04-24 RX ADMIN — Medication 1000 MILLIGRAM(S): at 11:50

## 2024-04-24 RX ADMIN — SODIUM CHLORIDE 100 MILLILITER(S): 9 INJECTION INTRAMUSCULAR; INTRAVENOUS; SUBCUTANEOUS at 11:20

## 2024-04-24 RX ADMIN — Medication 88 MICROGRAM(S): at 04:48

## 2024-04-24 RX ADMIN — PANTOPRAZOLE SODIUM 40 MILLIGRAM(S): 20 TABLET, DELAYED RELEASE ORAL at 05:32

## 2024-04-24 RX ADMIN — Medication 81 MILLIGRAM(S): at 05:32

## 2024-04-24 RX ADMIN — Medication 400 MILLIGRAM(S): at 11:24

## 2024-04-24 RX ADMIN — SODIUM CHLORIDE 500 MILLILITER(S): 9 INJECTION INTRAMUSCULAR; INTRAVENOUS; SUBCUTANEOUS at 09:22

## 2024-04-24 RX ADMIN — Medication 30 MILLILITER(S): at 07:59

## 2024-04-24 RX ADMIN — Medication 40 MILLIEQUIVALENT(S): at 06:55

## 2024-04-24 NOTE — DISCHARGE NOTE PROVIDER - PROVIDER TOKENS
PROVIDER:[TOKEN:[[id number]],FOLLOWUP:[1 week]],PROVIDER:[TOKEN:[[id number]],FOLLOWUP:[Routine]]

## 2024-04-24 NOTE — DISCHARGE NOTE PROVIDER - NSDCFUADDINST_GEN_ALL_CORE_FT
Wound Care:   the day AFTER your procedure remove bandage GENTTLY, and clean using  mild soap and gentle warm, water stream, pat dry. leave OPEN to air. YOU MAY SHOWER   DO NOT apply lotions, creams, ointments, powder, parfumes to your incision site  DO NOT SOAK your site for 1 week ( no baths, no pools, no tubs, etc...)  Check  your groin and /or wirst daily.A small amount of bruising, and soarness are normal    ACTIVITY: for 24 hours   - DO NOT DRIVE  - DO NOT make any important decisions or sign legal documents   - DO NOT operate heavy machinaries   - you may resume sexual activity in 48 hours, unless otherwise instructed by your cardiologist     If your procedure was done through the WRIST: for the NEXT 3DAYS:  - avoid pushing, pulling, with that affected wrist   - avoid repeated movement of that hand and wrist ( eg: typing, hammering)  - DO NOT LIFT anything more than 5 lbs     If your procedure was done through the GROIN: for the NEXT 5 DAYS  - Limit climbing stairs, DO NOT soak in bathtub or pool  - no strenous activities, pushing, pulling, straining  - Do not lift anything 10lbs or heavier     MEDICATION:   take your medications as explained ( see discharge paperwork)   If you received a STENT, you will be taking antiplatelet medications to KEEP YOUR STENT OPEN ( eg: Aspirin, Plavix, Brilinta, Effient, etc).  Take as prescribed DO NOT STOP taking them without consulting with your cardiologist first.     Follow heart healthy diet reccomended by your doctor, , if you smoke STOP SMOKING ( may call [phone] for center of tobacco control if you need assistance)     CALL your doctor to make appointment in 2 WEEKS     ***CALL YOUR DOCTOR***  if you experience: fever, chills, body aches, or severe pain, swelling, redness, heat or yellow discharge at incision site  If you experience Bleeding or excruciating pain at the procedural site, sweliing ( golf ball size) at your procedural site  If you experience CHEST PAIN  If you experience extremity numbness, tingling, temperature change ( of your procedural site)   If you are unable to reach your doctor, you may contact:   -Cardiology Office at [facility] at [phone] or   - [facility] [phone] - [facility] [phone]

## 2024-04-24 NOTE — PROGRESS NOTE ADULT - ASSESSMENT
Admitted with NSTEMI  Cathed and stented with KATTY to D1  TTE reviewed    Discharge planning with outpatient follow-up with Pete Briggs DO

## 2024-04-24 NOTE — DISCHARGE NOTE NURSING/CASE MANAGEMENT/SOCIAL WORK - PATIENT PORTAL LINK FT
You can access the FollowMyHealth Patient Portal offered by [facility] by registering at the following website: [email or web address]. By joining [facility]’s FollowMyHealth portal, you will also be able to view your health information using other applications (apps) compatible with our system.

## 2024-04-24 NOTE — DISCHARGE NOTE PROVIDER - HOSPITAL COURSE
63-year-old female history HTN, HLD, hypothyroidism presents with chest pain.  Patient brought in by ambulance reports episode of chest pain today while walking down the block described as midsternal radiating to both arms with 1 episode of nausea and vomiting.  reports several episodes with similar symptoms in the last week, 1 previous episode while on an exercise bike.  Reports history of CAD but no stents.  Saw her cardiologist this week had normal EKG, was scheduled for echo tomorrow and stress test later this week.  Patient was given 3 baby aspirin by EMS.  Chest pain-free at this time.  Denies associated SOB, dizziness, LOC, back pain, fever, chills, cough.    4/24/2024 s/p LHC via RRA : x1 KATTY to D1   cont DAPT   cont BB   f/u with Dr Interiano in 1-2 weeks, after that Dr Briggs   cleared for dc home after recovery     < from: TTE W or WO Ultrasound Enhancing Agent (04.24.24 @ 06:23) >    CONCLUSIONS:      1. Left ventricular cavity is normal in size. Left ventricular wall thickness is normal. Left ventricular systolic function is normal with an ejection fraction of 55 % by Woods's method of disks. There are no regional wall motion abnormalities seen.   2. Left ventricular global longitudinal strain is -13.6 % is abnormal (> -16%). Images were acquired on a [employer] ultrasound system and processed using [employer] strain analysis software with a heart rate of 67 bpm and a blood pressure of 119/56 mmHg.   3. Normal left ventricular diastolic function, with normal filling pressure.   4. Normal right ventricular cavity size, with normal wall thickness, and normal systolic function.   5. Normal left and right atrial size.   6. No significant valvular disease.   7. No pericardial effusion seen.   8. Compared to the transthoracic echocardiogram performed on 1/29/2021, there have been no significant interval changes.    < end of copied text >     63-year-old female history HTN, HLD, hypothyroidism presents with chest pain.  Patient brought in by ambulance reports episode of chest pain today while walking down the block described as midsternal radiating to both arms with 1 episode of nausea and vomiting.  reports several episodes with similar symptoms in the last week, 1 previous episode while on an exercise bike.  Reports history of CAD but no stents.  Saw her cardiologist this week had normal EKG, was scheduled for echo tomorrow and stress test later this week.  Patient was given 3 baby aspirin by EMS.  Chest pain-free at this time.  Denies associated SOB, dizziness, LOC, back pain, fever, chills, cough.    4/24/2024 s/p LHC via RRA : x1 KATTY to D1   cont DAPT   cont BB   f/u with Dr Interiano in 1-2 weeks, after that Dr Briggs   cleared for dc home after recovery at 17:00    < from: TTE W or WO Ultrasound Enhancing Agent (04.24.24 @ 06:23) >    CONCLUSIONS:      1. Left ventricular cavity is normal in size. Left ventricular wall thickness is normal. Left ventricular systolic function is normal with an ejection fraction of 55 % by Woods's method of disks. There are no regional wall motion abnormalities seen.   2. Left ventricular global longitudinal strain is -13.6 % is abnormal (> -16%). Images were acquired on a [employer] ultrasound system and processed using [employer] strain analysis software with a heart rate of 67 bpm and a blood pressure of 119/56 mmHg.   3. Normal left ventricular diastolic function, with normal filling pressure.   4. Normal right ventricular cavity size, with normal wall thickness, and normal systolic function.   5. Normal left and right atrial size.   6. No significant valvular disease.   7. No pericardial effusion seen.   8. Compared to the transthoracic echocardiogram performed on 1/29/2021, there have been no significant interval changes.    < end of copied text >

## 2024-04-24 NOTE — PROGRESS NOTE ADULT - SUBJECTIVE AND OBJECTIVE BOX
got call from cardiology fellow , recommend to start heparin GTT and Brilinta loading dose , asa 81mg daily     charlie escoto MD
HPI:  63-year-old female history HTN, HLD, hypothyroidism presents with chest pain.  Patient brought in by ambulance reports episode of chest pain today while walking down the block described as midsternal radiating to both arms with 1 episode of nausea and vomiting.  reports several episodes with similar symptoms in the last week, 1 previous episode while on an exercise bike.  Reports history of CAD but no stents.  Saw her cardiologist this week had normal EKG, was scheduled for echo tomorrow and stress test later this week.  Patient was given 3 baby aspirin by EMS.  Chest pain-free at this time.  Denies associated SOB, dizziness, LOC, back pain, fever, chills, cough. (2024 20:31)    Review Of Systems:           Respiratory: No shortness of breath, cough, or wheezing  Cardiovascular: No chest pain or palpitations  10 point review of systems is otherwise negative except as mentioned above        Medications:  aspirin enteric coated 81 milliGRAM(s) Oral daily  atorvastatin 80 milliGRAM(s) Oral at bedtime  levothyroxine 88 MICROGram(s) Oral daily  metoprolol succinate ER 25 milliGRAM(s) Oral daily  pantoprazole    Tablet 40 milliGRAM(s) Oral before breakfast  sodium chloride 0.9%. 1000 milliLiter(s) IV Continuous <Continuous>  ticagrelor 90 milliGRAM(s) Oral every 12 hours    PAST MEDICAL & SURGICAL HISTORY:  Malignant Neoplasm of Thyroid Gland      HLD (hyperlipidemia)      S/P Left Oophorectomy        S/P Carpal Tunnel Release  right - 2000      Impacted [place]  Tooth        S/P thyroidectomy        Vitals:  T(C): 36.6 (24 @ 10:50), Max: 36.6 (24 @ 05:16)  HR: 70 (24 @ 16:50) (61 - 75)  BP: 118/56 (24 @ 16:50) (108/57 - 135/65)  BP(mean): 81 (24 @ 16:50) (78 - 93)  RR: 15 (24 @ 16:50) (15 - 18)  SpO2: 95% (24 @ 16:50) (95% - 99%)  Wt(kg): --  Daily Height in cm: 157.48 (2024 07:35)    Daily   I&O's Summary    2024 07:01  -  2024 23:18  --------------------------------------------------------  IN: 240 mL / OUT: 0 mL / NET: 240 mL        Physical Exam:  Appearance: No acute distress; well appearing  Eyes: PERRL, EOMI, pink conjunctiva  HENT: Normal oral mucosa  Cardiovascular: RRR, S1, S2, no murmurs, rubs, or gallops; no edema; no JVD  Respiratory: Clear to auscultation bilaterally  Gastrointestinal: soft, non-tender, non-distended with normal bowel sounds  Musculoskeletal: No clubbing; no joint deformity   Neurologic: Non-focal  Lymphatic: No lymphadenopathy  Psychiatry: AAOx3, mood & affect appropriate  Skin: No rashes, ecchymoses, or cyanosis                          13.8   7.26  )-----------( 263      ( 2024 04:59 )             40.8     04-24    140  |  104  |  14  ----------------------------<  94  3.5   |  23  |  0.72    Ca    9.3      2024 04:59  Phos  3.9     04-23  Mg     2.2     04-23    TPro  7.4  /  Alb  4.7  /  TBili  0.2  /  DBili  x   /  AST  26  /  ALT  26  /  AlkPhos  78  04-23    PT/INR - ( 2024 17:04 )   PT: 11.0 sec;   INR: 1.00 ratio         PTT - ( 2024 04:59 )  PTT:77.6 sec  CARDIAC MARKERS ( 2024 04:59 )  x     / x     / 68 U/L / x     / 2.9 ng/mL  CARDIAC MARKERS ( 2024 18:36 )  x     / x     / 66 U/L / x     / 2.5 ng/mL  CARDIAC MARKERS ( 2024 17:04 )  x     / x     / 79 U/L / x     / 2.4 ng/mL        Total Cholesterol: 142  LDL: --  HDL: 46  T        ECG:    Echo:    Stress Testing:     Cath:    Imaging:    Interpretation of Telemetry:

## 2024-04-24 NOTE — ASU DISCHARGE PLAN (ADULT/PEDIATRIC) - NS MD DC FALL RISK RISK
For information on Fall & Injury Prevention, visit: [email or web address] OR  [email or web address] OR  https://www.cdc.gov/steadi/patient.html

## 2024-04-24 NOTE — DISCHARGE NOTE PROVIDER - NSDCFUADDAPPT_GEN_ALL_CORE_FT
f/u with Dr Interiano in 1-2 weeks   after that cont to follow with your cardiologist Dr Briggs as per routine

## 2024-04-24 NOTE — DISCHARGE NOTE PROVIDER - NSDCMRMEDTOKEN_GEN_ALL_CORE_FT
aspirin 81 mg oral delayed release tablet: 1 tab(s) orally 2 times a day  Brilinta (ticagrelor) 90 mg oral tablet: 1 tab(s) orally 2 times a day MDD: 2  levothyroxine 88 mcg (0.088 mg) oral tablet: 1 tab(s) orally once a day  metoprolol succinate 25 mg oral tablet, extended release: 1 tab(s) orally once a day MDD: 1  otc supplement(s): calcium / vitamin d3  rosuvastatin 20 mg oral tablet: 1 tab(s) orally once a day   Brilinta (ticagrelor) 90 mg oral tablet: 1 tab(s) orally 2 times a day MDD: 2  levothyroxine 88 mcg (0.088 mg) oral tablet: 1 tab(s) orally once a day  metoprolol succinate 25 mg oral tablet, extended release: 1 tab(s) orally once a day MDD: 1  otc supplement(s): calcium / vitamin d3  rosuvastatin 20 mg oral tablet: 1 tab(s) orally once a day

## 2024-04-24 NOTE — DISCHARGE NOTE PROVIDER - NSDCCPCAREPLAN_GEN_ALL_CORE_FT
PRINCIPAL DISCHARGE DIAGNOSIS  Diagnosis: CAD (coronary artery disease)  Assessment and Plan of Treatment: Low salt, low fat diet.   Weight management.   Take medications as prescribed.    No smoking.  Follow up appointments with your doctor(s)  as instruced.      SECONDARY DISCHARGE DIAGNOSES  Diagnosis: HTN (hypertension)  Assessment and Plan of Treatment: Continue with your blood pressure medications; eat a heart healthy diet with low salt diet; exercise regularly (consult with your physician or cardiologist first); maintain a heart healthy weight; if you smoke - quit (A resource to help you stop smoking is the [facility] [employer] – phone number [phone].); include healthy ways to manage stress. Continue to follow with your primary care physician or cardiologist.    Diagnosis: HLD (hyperlipidemia)  Assessment and Plan of Treatment: Continue with your cholesterol medications. Eat a heart healthy diet that is low in saturated fats and salt, and includes whole grains, fruits, vegetables and lean protein; exercise regularly (consult with your physician or cardiologist first); maintain a heart healthy weight; if you smoke - quit (A resource to help you stop smoking is the [facility] [employer] – phone number [phone].). Continue to follow with your primary physician or cardiologist.

## 2024-07-10 ENCOUNTER — APPOINTMENT (OUTPATIENT)
Dept: MRI IMAGING | Facility: CLINIC | Age: 64
End: 2024-07-10
Payer: COMMERCIAL

## 2024-07-10 ENCOUNTER — OUTPATIENT (OUTPATIENT)
Dept: OUTPATIENT SERVICES | Facility: HOSPITAL | Age: 64
LOS: 1 days | End: 2024-07-10
Payer: COMMERCIAL

## 2024-07-10 DIAGNOSIS — Z00.8 ENCOUNTER FOR OTHER GENERAL EXAMINATION: ICD-10-CM

## 2024-07-10 DIAGNOSIS — E89.0 POSTPROCEDURAL HYPOTHYROIDISM: Chronic | ICD-10-CM

## 2024-07-10 PROCEDURE — 70549 MR ANGIOGRAPH NECK W/O&W/DYE: CPT

## 2024-07-10 PROCEDURE — 70549 MR ANGIOGRAPH NECK W/O&W/DYE: CPT | Mod: 26

## 2024-07-10 PROCEDURE — A9585: CPT

## 2025-04-09 ENCOUNTER — EMERGENCY (EMERGENCY)
Facility: HOSPITAL | Age: 65
LOS: 1 days | End: 2025-04-09
Attending: EMERGENCY MEDICINE
Payer: COMMERCIAL

## 2025-04-09 VITALS
DIASTOLIC BLOOD PRESSURE: 90 MMHG | TEMPERATURE: 99 F | HEIGHT: 62 IN | OXYGEN SATURATION: 98 % | SYSTOLIC BLOOD PRESSURE: 161 MMHG | WEIGHT: 149.91 LBS | HEART RATE: 70 BPM | RESPIRATION RATE: 20 BRPM

## 2025-04-09 DIAGNOSIS — E89.0 POSTPROCEDURAL HYPOTHYROIDISM: Chronic | ICD-10-CM

## 2025-04-09 LAB
ADD ON TEST-SPECIMEN IN LAB: SIGNIFICANT CHANGE UP
ALBUMIN SERPL ELPH-MCNC: 4.6 G/DL — SIGNIFICANT CHANGE UP (ref 3.3–5)
ALP SERPL-CCNC: 80 U/L — SIGNIFICANT CHANGE UP (ref 40–120)
ALT FLD-CCNC: 33 U/L — SIGNIFICANT CHANGE UP (ref 10–45)
ANION GAP SERPL CALC-SCNC: 14 MMOL/L — SIGNIFICANT CHANGE UP (ref 5–17)
APTT BLD: 35.5 SEC — SIGNIFICANT CHANGE UP (ref 24.5–35.6)
AST SERPL-CCNC: 78 U/L — HIGH (ref 10–40)
BASOPHILS # BLD AUTO: 0.04 K/UL — SIGNIFICANT CHANGE UP (ref 0–0.2)
BASOPHILS NFR BLD AUTO: 0.5 % — SIGNIFICANT CHANGE UP (ref 0–2)
BILIRUB SERPL-MCNC: 0.4 MG/DL — SIGNIFICANT CHANGE UP (ref 0.2–1.2)
BUN SERPL-MCNC: 11 MG/DL — SIGNIFICANT CHANGE UP (ref 7–23)
CALCIUM SERPL-MCNC: 9.3 MG/DL — SIGNIFICANT CHANGE UP (ref 8.4–10.5)
CHLORIDE SERPL-SCNC: 100 MMOL/L — SIGNIFICANT CHANGE UP (ref 96–108)
CO2 SERPL-SCNC: 20 MMOL/L — LOW (ref 22–31)
CREAT SERPL-MCNC: 0.67 MG/DL — SIGNIFICANT CHANGE UP (ref 0.5–1.3)
EGFR: 96 ML/MIN/1.73M2 — SIGNIFICANT CHANGE UP
EGFR: 96 ML/MIN/1.73M2 — SIGNIFICANT CHANGE UP
EOSINOPHIL # BLD AUTO: 0.15 K/UL — SIGNIFICANT CHANGE UP (ref 0–0.5)
EOSINOPHIL NFR BLD AUTO: 2 % — SIGNIFICANT CHANGE UP (ref 0–6)
GLUCOSE SERPL-MCNC: 107 MG/DL — HIGH (ref 70–99)
HCT VFR BLD CALC: 42.2 % — SIGNIFICANT CHANGE UP (ref 34.5–45)
HGB BLD-MCNC: 14.1 G/DL — SIGNIFICANT CHANGE UP (ref 11.5–15.5)
IMM GRANULOCYTES NFR BLD AUTO: 0.4 % — SIGNIFICANT CHANGE UP (ref 0–0.9)
INR BLD: 1 RATIO — SIGNIFICANT CHANGE UP (ref 0.85–1.16)
LYMPHOCYTES # BLD AUTO: 2.05 K/UL — SIGNIFICANT CHANGE UP (ref 1–3.3)
LYMPHOCYTES # BLD AUTO: 26.7 % — SIGNIFICANT CHANGE UP (ref 13–44)
MCHC RBC-ENTMCNC: 29.2 PG — SIGNIFICANT CHANGE UP (ref 27–34)
MCHC RBC-ENTMCNC: 33.4 G/DL — SIGNIFICANT CHANGE UP (ref 32–36)
MCV RBC AUTO: 87.4 FL — SIGNIFICANT CHANGE UP (ref 80–100)
MONOCYTES # BLD AUTO: 0.49 K/UL — SIGNIFICANT CHANGE UP (ref 0–0.9)
MONOCYTES NFR BLD AUTO: 6.4 % — SIGNIFICANT CHANGE UP (ref 2–14)
NEUTROPHILS # BLD AUTO: 4.93 K/UL — SIGNIFICANT CHANGE UP (ref 1.8–7.4)
NEUTROPHILS NFR BLD AUTO: 64 % — SIGNIFICANT CHANGE UP (ref 43–77)
NRBC BLD AUTO-RTO: 0 /100 WBCS — SIGNIFICANT CHANGE UP (ref 0–0)
PLATELET # BLD AUTO: 226 K/UL — SIGNIFICANT CHANGE UP (ref 150–400)
POTASSIUM SERPL-MCNC: SIGNIFICANT CHANGE UP MMOL/L (ref 3.5–5.3)
POTASSIUM SERPL-SCNC: SIGNIFICANT CHANGE UP MMOL/L (ref 3.5–5.3)
PROT SERPL-MCNC: 7.9 G/DL — SIGNIFICANT CHANGE UP (ref 6–8.3)
PROTHROM AB SERPL-ACNC: 11.5 SEC — SIGNIFICANT CHANGE UP (ref 9.9–13.4)
RBC # BLD: 4.83 M/UL — SIGNIFICANT CHANGE UP (ref 3.8–5.2)
RBC # FLD: 13.3 % — SIGNIFICANT CHANGE UP (ref 10.3–14.5)
SODIUM SERPL-SCNC: 134 MMOL/L — LOW (ref 135–145)
TROPONIN T, HIGH SENSITIVITY RESULT: 9 NG/L — SIGNIFICANT CHANGE UP (ref 0–51)
TROPONIN T, HIGH SENSITIVITY RESULT: <6 NG/L — SIGNIFICANT CHANGE UP (ref 0–51)
WBC # BLD: 7.69 K/UL — SIGNIFICANT CHANGE UP (ref 3.8–10.5)
WBC # FLD AUTO: 7.69 K/UL — SIGNIFICANT CHANGE UP (ref 3.8–10.5)

## 2025-04-09 PROCEDURE — 70496 CT ANGIOGRAPHY HEAD: CPT | Mod: 26

## 2025-04-09 PROCEDURE — 70450 CT HEAD/BRAIN W/O DYE: CPT | Mod: 26,59

## 2025-04-09 PROCEDURE — 70498 CT ANGIOGRAPHY NECK: CPT | Mod: 26

## 2025-04-09 PROCEDURE — 99223 1ST HOSP IP/OBS HIGH 75: CPT

## 2025-04-09 PROCEDURE — 0042T: CPT

## 2025-04-09 PROCEDURE — 93010 ELECTROCARDIOGRAM REPORT: CPT

## 2025-04-09 RX ORDER — LEVOTHYROXINE SODIUM 300 MCG
88 TABLET ORAL DAILY
Refills: 0 | Status: DISCONTINUED | OUTPATIENT
Start: 2025-04-10 | End: 2025-04-13

## 2025-04-09 RX ORDER — METOCLOPRAMIDE HCL 10 MG
10 TABLET ORAL ONCE
Refills: 0 | Status: COMPLETED | OUTPATIENT
Start: 2025-04-09 | End: 2025-04-09

## 2025-04-09 RX ORDER — ROSUVASTATIN CALCIUM 20 MG/1
40 TABLET, FILM COATED ORAL DAILY
Refills: 0 | Status: DISCONTINUED | OUTPATIENT
Start: 2025-04-10 | End: 2025-04-13

## 2025-04-09 RX ORDER — CLOPIDOGREL BISULFATE 75 MG/1
75 TABLET, FILM COATED ORAL DAILY
Refills: 0 | Status: DISCONTINUED | OUTPATIENT
Start: 2025-04-10 | End: 2025-04-13

## 2025-04-09 RX ORDER — ASPIRIN 325 MG
81 TABLET ORAL DAILY
Refills: 0 | Status: DISCONTINUED | OUTPATIENT
Start: 2025-04-10 | End: 2025-04-13

## 2025-04-09 RX ORDER — ACETAMINOPHEN 500 MG/5ML
1000 LIQUID (ML) ORAL ONCE
Refills: 0 | Status: COMPLETED | OUTPATIENT
Start: 2025-04-09 | End: 2025-04-09

## 2025-04-09 RX ADMIN — Medication 10 MILLIGRAM(S): at 20:52

## 2025-04-10 ENCOUNTER — RESULT REVIEW (OUTPATIENT)
Age: 65
End: 2025-04-10

## 2025-04-10 VITALS
RESPIRATION RATE: 18 BRPM | OXYGEN SATURATION: 95 % | SYSTOLIC BLOOD PRESSURE: 107 MMHG | DIASTOLIC BLOOD PRESSURE: 66 MMHG | HEART RATE: 72 BPM

## 2025-04-10 LAB
A1C WITH ESTIMATED AVERAGE GLUCOSE RESULT: 5.8 % — HIGH (ref 4–5.6)
CHOLEST SERPL-MCNC: 133 MG/DL — SIGNIFICANT CHANGE UP
ESTIMATED AVERAGE GLUCOSE: 120 MG/DL — HIGH (ref 68–114)
HDLC SERPL-MCNC: 45 MG/DL — LOW
LDLC SERPL-MCNC: 60 MG/DL — SIGNIFICANT CHANGE UP
LIPID PNL WITH DIRECT LDL SERPL: 60 MG/DL — SIGNIFICANT CHANGE UP
NONHDLC SERPL-MCNC: 88 MG/DL — SIGNIFICANT CHANGE UP
PA ADP PRP-ACNC: 47 PRU — LOW (ref 182–335)
TRIGL SERPL-MCNC: 161 MG/DL — HIGH

## 2025-04-10 PROCEDURE — 93306 TTE W/DOPPLER COMPLETE: CPT

## 2025-04-10 PROCEDURE — 85730 THROMBOPLASTIN TIME PARTIAL: CPT

## 2025-04-10 PROCEDURE — 36415 COLL VENOUS BLD VENIPUNCTURE: CPT

## 2025-04-10 PROCEDURE — 70450 CT HEAD/BRAIN W/O DYE: CPT | Mod: MC

## 2025-04-10 PROCEDURE — 80048 BASIC METABOLIC PNL TOTAL CA: CPT

## 2025-04-10 PROCEDURE — 70551 MRI BRAIN STEM W/O DYE: CPT | Mod: MC

## 2025-04-10 PROCEDURE — 93005 ELECTROCARDIOGRAM TRACING: CPT

## 2025-04-10 PROCEDURE — 85576 BLOOD PLATELET AGGREGATION: CPT

## 2025-04-10 PROCEDURE — 82962 GLUCOSE BLOOD TEST: CPT

## 2025-04-10 PROCEDURE — 84484 ASSAY OF TROPONIN QUANT: CPT

## 2025-04-10 PROCEDURE — 93306 TTE W/DOPPLER COMPLETE: CPT | Mod: 26

## 2025-04-10 PROCEDURE — 93356 MYOCRD STRAIN IMG SPCKL TRCK: CPT

## 2025-04-10 PROCEDURE — 96374 THER/PROPH/DIAG INJ IV PUSH: CPT | Mod: XU

## 2025-04-10 PROCEDURE — 76376 3D RENDER W/INTRP POSTPROCES: CPT

## 2025-04-10 PROCEDURE — 85025 COMPLETE CBC W/AUTO DIFF WBC: CPT

## 2025-04-10 PROCEDURE — 85610 PROTHROMBIN TIME: CPT

## 2025-04-10 PROCEDURE — 99239 HOSP IP/OBS DSCHRG MGMT >30: CPT

## 2025-04-10 PROCEDURE — 70498 CT ANGIOGRAPHY NECK: CPT | Mod: MC

## 2025-04-10 PROCEDURE — 70551 MRI BRAIN STEM W/O DYE: CPT | Mod: 26

## 2025-04-10 PROCEDURE — 80061 LIPID PANEL: CPT

## 2025-04-10 PROCEDURE — 70496 CT ANGIOGRAPHY HEAD: CPT | Mod: MC

## 2025-04-10 PROCEDURE — 99285 EMERGENCY DEPT VISIT HI MDM: CPT | Mod: 25

## 2025-04-10 PROCEDURE — 0042T: CPT | Mod: MC

## 2025-04-10 PROCEDURE — 83036 HEMOGLOBIN GLYCOSYLATED A1C: CPT

## 2025-04-10 PROCEDURE — G0378: CPT

## 2025-04-10 PROCEDURE — 80053 COMPREHEN METABOLIC PANEL: CPT

## 2025-04-10 RX ADMIN — Medication 88 MICROGRAM(S): at 07:22

## 2025-04-10 RX ADMIN — CLOPIDOGREL BISULFATE 75 MILLIGRAM(S): 75 TABLET, FILM COATED ORAL at 11:59

## 2025-04-10 RX ADMIN — ROSUVASTATIN CALCIUM 40 MILLIGRAM(S): 20 TABLET, FILM COATED ORAL at 11:58

## 2025-04-10 RX ADMIN — Medication 81 MILLIGRAM(S): at 11:58

## 2025-05-31 ENCOUNTER — EMERGENCY (EMERGENCY)
Facility: HOSPITAL | Age: 65
LOS: 1 days | End: 2025-05-31
Attending: EMERGENCY MEDICINE
Payer: MEDICARE

## 2025-05-31 VITALS
HEART RATE: 124 BPM | DIASTOLIC BLOOD PRESSURE: 93 MMHG | SYSTOLIC BLOOD PRESSURE: 143 MMHG | WEIGHT: 149.91 LBS | HEIGHT: 62 IN | OXYGEN SATURATION: 98 % | RESPIRATION RATE: 19 BRPM | TEMPERATURE: 98 F

## 2025-05-31 DIAGNOSIS — E89.0 POSTPROCEDURAL HYPOTHYROIDISM: Chronic | ICD-10-CM

## 2025-05-31 LAB
ALBUMIN SERPL ELPH-MCNC: 4.6 G/DL — SIGNIFICANT CHANGE UP (ref 3.3–5)
ALP SERPL-CCNC: 105 U/L — SIGNIFICANT CHANGE UP (ref 40–120)
ALT FLD-CCNC: 51 U/L — HIGH (ref 10–45)
ANION GAP SERPL CALC-SCNC: 18 MMOL/L — HIGH (ref 5–17)
APTT BLD: 34.7 SEC — SIGNIFICANT CHANGE UP (ref 26.1–36.8)
AST SERPL-CCNC: 53 U/L — HIGH (ref 10–40)
BASOPHILS # BLD AUTO: 0.04 K/UL — SIGNIFICANT CHANGE UP (ref 0–0.2)
BASOPHILS NFR BLD AUTO: 0.5 % — SIGNIFICANT CHANGE UP (ref 0–2)
BILIRUB SERPL-MCNC: 0.2 MG/DL — SIGNIFICANT CHANGE UP (ref 0.2–1.2)
BUN SERPL-MCNC: 19 MG/DL — SIGNIFICANT CHANGE UP (ref 7–23)
CALCIUM SERPL-MCNC: 9.6 MG/DL — SIGNIFICANT CHANGE UP (ref 8.4–10.5)
CHLORIDE SERPL-SCNC: 105 MMOL/L — SIGNIFICANT CHANGE UP (ref 96–108)
CO2 SERPL-SCNC: 19 MMOL/L — LOW (ref 22–31)
CREAT SERPL-MCNC: 0.91 MG/DL — SIGNIFICANT CHANGE UP (ref 0.5–1.3)
EGFR: 69 ML/MIN/1.73M2 — SIGNIFICANT CHANGE UP
EGFR: 69 ML/MIN/1.73M2 — SIGNIFICANT CHANGE UP
EOSINOPHIL # BLD AUTO: 0.15 K/UL — SIGNIFICANT CHANGE UP (ref 0–0.5)
EOSINOPHIL NFR BLD AUTO: 1.8 % — SIGNIFICANT CHANGE UP (ref 0–6)
GLUCOSE SERPL-MCNC: 120 MG/DL — HIGH (ref 70–99)
HCT VFR BLD CALC: 42.4 % — SIGNIFICANT CHANGE UP (ref 34.5–45)
HGB BLD-MCNC: 14.1 G/DL — SIGNIFICANT CHANGE UP (ref 11.5–15.5)
IMM GRANULOCYTES NFR BLD AUTO: 0.2 % — SIGNIFICANT CHANGE UP (ref 0–0.9)
INR BLD: 0.94 RATIO — SIGNIFICANT CHANGE UP (ref 0.85–1.16)
LYMPHOCYTES # BLD AUTO: 2.75 K/UL — SIGNIFICANT CHANGE UP (ref 1–3.3)
LYMPHOCYTES # BLD AUTO: 32.6 % — SIGNIFICANT CHANGE UP (ref 13–44)
MAGNESIUM SERPL-MCNC: 2.2 MG/DL — SIGNIFICANT CHANGE UP (ref 1.6–2.6)
MCHC RBC-ENTMCNC: 28.7 PG — SIGNIFICANT CHANGE UP (ref 27–34)
MCHC RBC-ENTMCNC: 33.3 G/DL — SIGNIFICANT CHANGE UP (ref 32–36)
MCV RBC AUTO: 86.4 FL — SIGNIFICANT CHANGE UP (ref 80–100)
MONOCYTES # BLD AUTO: 0.59 K/UL — SIGNIFICANT CHANGE UP (ref 0–0.9)
MONOCYTES NFR BLD AUTO: 7 % — SIGNIFICANT CHANGE UP (ref 2–14)
NEUTROPHILS # BLD AUTO: 4.89 K/UL — SIGNIFICANT CHANGE UP (ref 1.8–7.4)
NEUTROPHILS NFR BLD AUTO: 57.9 % — SIGNIFICANT CHANGE UP (ref 43–77)
NRBC BLD AUTO-RTO: 0 /100 WBCS — SIGNIFICANT CHANGE UP (ref 0–0)
NT-PROBNP SERPL-SCNC: 76 PG/ML — SIGNIFICANT CHANGE UP (ref 0–300)
PLATELET # BLD AUTO: 244 K/UL — SIGNIFICANT CHANGE UP (ref 150–400)
POTASSIUM SERPL-MCNC: 3.5 MMOL/L — SIGNIFICANT CHANGE UP (ref 3.5–5.3)
POTASSIUM SERPL-SCNC: 3.5 MMOL/L — SIGNIFICANT CHANGE UP (ref 3.5–5.3)
PROT SERPL-MCNC: 7.3 G/DL — SIGNIFICANT CHANGE UP (ref 6–8.3)
PROTHROM AB SERPL-ACNC: 10.7 SEC — SIGNIFICANT CHANGE UP (ref 9.9–13.4)
RBC # BLD: 4.91 M/UL — SIGNIFICANT CHANGE UP (ref 3.8–5.2)
RBC # FLD: 13.2 % — SIGNIFICANT CHANGE UP (ref 10.3–14.5)
SODIUM SERPL-SCNC: 142 MMOL/L — SIGNIFICANT CHANGE UP (ref 135–145)
TROPONIN T, HIGH SENSITIVITY RESULT: 15 NG/L — SIGNIFICANT CHANGE UP (ref 0–51)
TROPONIN T, HIGH SENSITIVITY RESULT: 15 NG/L — SIGNIFICANT CHANGE UP (ref 0–51)
TROPONIN T, HIGH SENSITIVITY RESULT: 19 NG/L — SIGNIFICANT CHANGE UP (ref 0–51)
TROPONIN T, HIGH SENSITIVITY RESULT: 7 NG/L — SIGNIFICANT CHANGE UP (ref 0–51)
TSH SERPL-MCNC: 0.21 UIU/ML — LOW (ref 0.27–4.2)
WBC # BLD: 8.44 K/UL — SIGNIFICANT CHANGE UP (ref 3.8–10.5)
WBC # FLD AUTO: 8.44 K/UL — SIGNIFICANT CHANGE UP (ref 3.8–10.5)

## 2025-05-31 PROCEDURE — 93010 ELECTROCARDIOGRAM REPORT: CPT

## 2025-05-31 PROCEDURE — 99222 1ST HOSP IP/OBS MODERATE 55: CPT | Mod: GC

## 2025-05-31 PROCEDURE — 71045 X-RAY EXAM CHEST 1 VIEW: CPT | Mod: 26

## 2025-05-31 PROCEDURE — 99291 CRITICAL CARE FIRST HOUR: CPT | Mod: GC

## 2025-05-31 RX ORDER — METOPROLOL SUCCINATE 50 MG/1
5 TABLET, EXTENDED RELEASE ORAL ONCE
Refills: 0 | Status: COMPLETED | OUTPATIENT
Start: 2025-05-31 | End: 2025-05-31

## 2025-05-31 RX ORDER — METOPROLOL SUCCINATE 50 MG/1
50 TABLET, EXTENDED RELEASE ORAL ONCE
Refills: 0 | Status: COMPLETED | OUTPATIENT
Start: 2025-05-31 | End: 2025-05-31

## 2025-05-31 RX ORDER — CLOPIDOGREL BISULFATE 75 MG/1
75 TABLET, FILM COATED ORAL DAILY
Refills: 0 | Status: DISCONTINUED | OUTPATIENT
Start: 2025-06-01 | End: 2025-06-03

## 2025-05-31 RX ORDER — ROSUVASTATIN CALCIUM 20 MG/1
40 TABLET, FILM COATED ORAL DAILY
Refills: 0 | Status: DISCONTINUED | OUTPATIENT
Start: 2025-05-31 | End: 2025-06-03

## 2025-05-31 RX ORDER — METOPROLOL SUCCINATE 50 MG/1
25 TABLET, EXTENDED RELEASE ORAL ONCE
Refills: 0 | Status: COMPLETED | OUTPATIENT
Start: 2025-05-31 | End: 2025-05-31

## 2025-05-31 RX ORDER — ASPIRIN 325 MG
81 TABLET ORAL DAILY
Refills: 0 | Status: DISCONTINUED | OUTPATIENT
Start: 2025-05-31 | End: 2025-05-31

## 2025-05-31 RX ORDER — APIXABAN 2.5 MG/1
5 TABLET, FILM COATED ORAL EVERY 12 HOURS
Refills: 0 | Status: DISCONTINUED | OUTPATIENT
Start: 2025-05-31 | End: 2025-06-03

## 2025-05-31 RX ORDER — LEVOTHYROXINE SODIUM 300 MCG
88 TABLET ORAL DAILY
Refills: 0 | Status: DISCONTINUED | OUTPATIENT
Start: 2025-05-31 | End: 2025-05-31

## 2025-05-31 RX ORDER — APIXABAN 2.5 MG/1
5 TABLET, FILM COATED ORAL ONCE
Refills: 0 | Status: COMPLETED | OUTPATIENT
Start: 2025-05-31 | End: 2025-05-31

## 2025-05-31 RX ORDER — ASPIRIN 325 MG
324 TABLET ORAL ONCE
Refills: 0 | Status: COMPLETED | OUTPATIENT
Start: 2025-05-31 | End: 2025-05-31

## 2025-05-31 RX ADMIN — Medication 1000 MILLILITER(S): at 01:06

## 2025-05-31 RX ADMIN — APIXABAN 5 MILLIGRAM(S): 2.5 TABLET, FILM COATED ORAL at 22:22

## 2025-05-31 RX ADMIN — METOPROLOL SUCCINATE 25 MILLIGRAM(S): 50 TABLET, EXTENDED RELEASE ORAL at 20:14

## 2025-05-31 RX ADMIN — Medication 88 MICROGRAM(S): at 11:08

## 2025-05-31 RX ADMIN — ROSUVASTATIN CALCIUM 40 MILLIGRAM(S): 20 TABLET, FILM COATED ORAL at 11:07

## 2025-05-31 RX ADMIN — APIXABAN 5 MILLIGRAM(S): 2.5 TABLET, FILM COATED ORAL at 09:44

## 2025-05-31 RX ADMIN — METOPROLOL SUCCINATE 5 MILLIGRAM(S): 50 TABLET, EXTENDED RELEASE ORAL at 01:06

## 2025-05-31 RX ADMIN — Medication 324 MILLIGRAM(S): at 09:50

## 2025-05-31 RX ADMIN — METOPROLOL SUCCINATE 50 MILLIGRAM(S): 50 TABLET, EXTENDED RELEASE ORAL at 01:06

## 2025-05-31 NOTE — ED CDU PROVIDER INITIAL DAY NOTE - ATTENDING APP SHARED VISIT CONTRIBUTION OF CARE
Dr. Tracy: I performed a face to face bedside interview with patient regarding history of present illness, review of symptoms and past medical history. I completed an independent physical exam.  I have discussed patient's plan of care with NIRALI.   I agree with note as stated above, having amended the EMR as needed to reflect my findings.   This includes HISTORY OF PRESENT ILLNESS, HIV, PAST MEDICAL/SURGICAL/FAMILY/SOCIAL HISTORY, ALLERGIES AND HOME MEDICATIONS, REVIEW OF SYSTEMS, PHYSICAL EXAM, and any PROGRESS NOTES during the time I functioned as the attending physician for this patient.    see mdm

## 2025-05-31 NOTE — ED PROVIDER NOTE - PHYSICAL EXAMINATION
CONSTITUTIONAL: awake; in no acute distress.   SKIN: warm, dry  HEAD: Normocephalic; atraumatic.  EYES: no conjunctival injection. no scleral icterus  ENT: No nasal discharge; airway clear.  CARD: S1, S2 normal; irregular, tachycardic  RESP: No wheezes, rales or rhonchi. Good air movement bilaterally.   ABD: soft ntnd, no guarding, no distention, no rigidity.   EXT:  No cyanosis or edema.   NEURO: Alert, oriented, grossly unremarkable  PSYCH: Cooperative, appropriate.

## 2025-05-31 NOTE — ED CDU PROVIDER INITIAL DAY NOTE - NSICDXPASTSURGICALHX_GEN_ALL_CORE_FT
PAST SURGICAL HISTORY:  Impacted [place]  Tooth 1980    S/P Carpal Tunnel Release right - 2000    S/P Left Oophorectomy 1981    S/P thyroidectomy

## 2025-05-31 NOTE — ED CDU PROVIDER INITIAL DAY NOTE - DETAILS
vital signs q4h, monitor on tele, Stress test, cardiology consult, frequent re-evaluations  case d/w Dr. Tracy

## 2025-05-31 NOTE — ED PROVIDER NOTE - ATTENDING CONTRIBUTION TO CARE
65-year-old female past medical history of hypothyroid, hyperlipidemia, CAD on bASA status post stent presenting for palpitations and chest tightness acute onset ~2345 and were still present on her arrival here.  With initial vital signs with tachycardia to the 140s to 160s and EKG showing Afib RVR w/o ischemic changes. Pt reports hx of palp in past but are fleeting and resolve after minutes. Has had Holter monitor in past which did not catch any events. She is supposed to go for cardiac MRI- unsure of reason. Not  currently on AC. No SOB, LE swelling or pain. No syncope. Tachy- afib rvr, normotensive, sat well on RA. PE as above. Sx likley due to afib RVR, low suspicion for VTE or ACS. Hx of hypothyroid. Can send TSH. Check electrolytes. Will tx afib w BB, as she currently takes metoprolol 25 mg BID- has not taken night dose yet. Monitor on tele. Has a cardio OP DR Tello Briggs. Dispo pending results labs, rate control, reassessment.

## 2025-05-31 NOTE — ED ADULT TRIAGE NOTE - NSWEIGHTCALCTOOLDRUG_GEN_A_CORE
CC: f/u for fever    Patient reports: he is withdrawn, offers no focal complaints    REVIEW OF SYSTEMS:  All other review of systems negative (Comprehensive ROS)    Antimicrobials Day #  :day 3-4  piperacillin/tazobactam IVPB.. 3.375 Gram(s) IV Intermittent every 8 hours    Other Medications Reviewed  MEDICATIONS  (STANDING):  aspirin  chewable 81 milliGRAM(s) Oral daily  dextrose 40% Gel 15 Gram(s) Oral once  dextrose 5%. 1000 milliLiter(s) (50 mL/Hr) IV Continuous <Continuous>  dextrose 5%. 1000 milliLiter(s) (100 mL/Hr) IV Continuous <Continuous>  dextrose 50% Injectable 25 Gram(s) IV Push once  dextrose 50% Injectable 12.5 Gram(s) IV Push once  dextrose 50% Injectable 25 Gram(s) IV Push once  finasteride 5 milliGRAM(s) Oral daily  glucagon  Injectable 1 milliGRAM(s) IntraMuscular once  insulin glargine Injectable (LANTUS) 10 Unit(s) SubCutaneous at bedtime  insulin lispro (ADMELOG) corrective regimen sliding scale   SubCutaneous three times a day before meals  insulin lispro (ADMELOG) corrective regimen sliding scale   SubCutaneous at bedtime  pantoprazole    Tablet 40 milliGRAM(s) Oral before breakfast  piperacillin/tazobactam IVPB.. 3.375 Gram(s) IV Intermittent every 8 hours  QUEtiapine 25 milliGRAM(s) Oral at bedtime  sodium chloride 0.9%. 1000 milliLiter(s) (100 mL/Hr) IV Continuous <Continuous>  tamsulosin 0.4 milliGRAM(s) Oral at bedtime    T(F): 98.8 (07-03-21 @ 04:59), Max: 98.8 (07-03-21 @ 04:59)  HR: 102 (07-03-21 @ 04:59)  BP: 102/67 (07-03-21 @ 04:59)  RR: 18 (07-03-21 @ 04:59)  SpO2: 98% (07-03-21 @ 04:59)  Wt(kg): --    PHYSICAL EXAM:  General: alert, no acute distress, withdrawn  Eyes:  anicteric, no conjunctival injection, no discharge  Oropharynx: no lesions or injection 	  Neck: supple, without adenopathy  Lungs: clear to auscultation  Heart: regular rate and rhythm; no murmur, rubs or gallops  Abdomen: soft, nondistended, nontender, without mass or organomegaly  Skin: no lesions  Extremities: no clubbing, cyanosis, or edema  Neurologic: alert, oriented, moves all extremities  speech hypophonic   es[place]  LAB RESULTS:                        8.5    2.03  )-----------( 68       ( 02 Jul 2021 06:30 )             27.3     07-02    141  |  107  |  19  ----------------------------<  135<H>  3.6   |  21<L>  |  0.76    Ca    7.8<L>      02 Jul 2021 06:30  Phos  2.1     07-02  Mg     1.4     07-02          MICROBIOLOGY:  RECENT CULTURES:  07-01 @ 02:34 .Blood Blood     No growth to date.      06-28 @ 17:26 .Urine Clean Catch (Midstream)     <10,000 CFU/mL Normal Urogenital Jade      06-28 @ 16:28 .Blood Blood-Peripheral     No growth to date.      06-28 @ 16:26 .Blood Blood-Peripheral     No growth to date.          RADIOLOGY REVIEWED:    < from: CT Chest w/ IV Cont (06.28.21 @ 19:01) >  IMPRESSION:    Patchy groundglass opacities and scattered nodular opacities throughout all lobes of the lungs. Findings may be related to known lymphoma or may be seen in the setting of multifocal infection.    Mildly enlarged supraclavicular, mediastinal, hilar, and retroperitoneal lymphadenopathy likely related to known lymphoma.      < end of copied text >    used

## 2025-05-31 NOTE — ED ADULT NURSE REASSESSMENT NOTE - NS ED NURSE REASSESS COMMENT FT1
@1100 Pt received from CHANTELL Garcia. Pt oriented to CDU & plan of care was discussed. Pt A&O x 4. Pt in CDU for monitor on tele, Stress test, cardiology consult. Pt denies any chest pain, SOB, dizziness or palpitations as of now. Pt on a cardiac monitor in NSR, HR in 70's. V/S stable, pt afebrile,  IV in place, patent and free of signs of infiltration. Pt resting in bed. Safety & comfort measures maintained. Call bell in reach. Will continue to monitor.

## 2025-05-31 NOTE — ED CDU PROVIDER DISPOSITION NOTE - NSFOLLOWUPINSTRUCTIONS_ED_ALL_ED_FT
Stay well-hydrated.  Stop taking aspirin.  Start taking Plavix 75 mg daily  Start taking Eliquis 5 mg daily.  Continue all other home medications   Follow-up with your PCP in 2 to 3 days.  Bring copy of results with you to department.  Follow-up with cardiologist within 1 week.  Call to schedule appointment.  Return to the ER for worsening palpitations, chest pain shortness of breath or any other concerns. Stay well-hydrated.  Stop taking aspirin.  Start taking Plavix 75 mg daily  Start taking Eliquis 5 mg daily.      **SYNTHROID**    Follow-up with your PCP in 2 to 3 days.  Bring copy of results with you to department.  Follow-up with cardiologist within 1 week.  Call to schedule appointment.  Return to the ER for worsening palpitations, chest pain shortness of breath or any other concerns. Stay well-hydrated.  STOP taking aspirin.  Start taking Plavix 75 mg daily  Start taking Eliquis 5 mg twice daily.  Continue Metoprolol as prescribed    **SYNTHROID**  Recommend decrease total weekly dose   Take Synthroid 88 mcg daily with 1/2 tab on Sundays instead of a whole given borderline low TSH and afib. Follow up with Dr. Pittman in 6-8 weeks. Call tomorrow to schedule appointment.      Follow-up with your PCP in 2 to 3 days.  Bring copy of results with you to department.  Follow-up with cardiologist within 1 week. Will need outpatient TTE. Call to schedule appointment.  Return to the ER for worsening palpitations, chest pain shortness of breath or any other concerns.

## 2025-05-31 NOTE — ED PROVIDER NOTE - PROGRESS NOTE DETAILS
Converted after IV lopressor, Takes Metoprolol 25mg BID, did not take night dose however BP on low side after IV metoprolol- 94/63 MAP 75. No dizzy. Will hold off on oral BB for now. Dr. Tracy: Received signout from Dr. Rivas.  65-year-old female history of CAD with stents, hyperlipidemia, presenting with palpitations and chest discomfort.  Patient found to be in A-fib with RVR in the 160s overnight, received metoprolol and converted to normal sinus rhythm.  Of note 1 month ago patient was in the CDU for TIA workup.  Patient seen at bedside, currently resting comfortably, however patient's troponin x 3 were 7, 15, 19.  Page placed to discussed with patient's cardiologist Dr. Pete Briggs regarding plan of care.  Patient is not on any anticoagulation, on baby aspirin only. Dr. Tracy: Received call from Dr. Cabrera who is covering for Dr. Briggs, declined to take consult, states we need to speak to on-call cardiology.  Explained to Dr. Cabrera that this is a patient who is well-known to Dr. Briggs and thus will be easier to have a discussion with her directly, however Dr. Briggs and Dr. Cabrera declined any consults today and recommend discussion with house cardiology.  Cardiology fellow was called who will see patient. Dr. Tracy: Received call from Dr. Cortes who also covers for Dr. Cabrera and Dr. Briggs, however Dr. Cortes and Rick do not have access to her records. Dr. Tracy: Patient reassessed at bedside, well-appearing, states she feels some discomfort persistently in the left side of her chest. Last echo was a month ago, last stress unknown, last stent a year ago. Will repeat another troponin.  Discussed with cardiology fellow Dr. Boykin who will see her within an hour. Recommends CDU for stress and Eliquis for Afib. D/w CDU PA Peter.

## 2025-05-31 NOTE — ED CDU PROVIDER DISPOSITION NOTE - ATTENDING APP SHARED VISIT CONTRIBUTION OF CARE
Adult female pw c/o palps/new onset afib w rvr. Rate controlled, seen by endo thyroid meds adjusted. Seen and cleared by cards. Started on ac and stable for dc  Manuel Rosario MD, Facep

## 2025-05-31 NOTE — CONSULT NOTE ADULT - ATTENDING COMMENTS
Patient seen and examined. Agree with assessment and plan as outlined above. Patient with CAD with prior PCI presents with chest pain. She was noted to be in AF with spontaneous conversion to NSR after IV metoprolol. HS-trop curve not consistent with ACS. That said given symptom and new AF NST is reasonable. Continue medications as outlined. A/C given AF.

## 2025-05-31 NOTE — ED ADULT TRIAGE NOTE - CHIEF COMPLAINT QUOTE
Non radiating chest pain and palpitations x 20 minutes. Pt reports chest pain started after walking upstairs. PMH of HTN and 1 cardiac stent.

## 2025-05-31 NOTE — ED ADULT NURSE NOTE - OBJECTIVE STATEMENT
patient is a 64 y/o F with hx of HTN, CAD w 1 stent, HLD who presents to the ED via triage c/o palpitations. patient states that 30 minutes PTA she developed sudden onset "chest fluttering" and "feeling tired" while walking up the stairs. patient states that she had recent echo, wore a holter monitor and is scheduled for cardiac MRI in june. on arrival patient in afib RVR to the 140s. ECG obtained, placed on CM. IV access established. MD Rivas at bedside to assess patient. patient a&ox4, respirations even and unlabored. abdomen soft., nondistended. skin intact. denies fevers/chills, numbness/tingling, weakness, headache, dizziness, vision changes, cough, abd pain, n/v/d, dysuria, hematuria, bloody stools, back pain. updated on plan of care. comfort and safety maintained

## 2025-05-31 NOTE — ED PROVIDER NOTE - PROGRESS NOTE ADDITIONAL1
Daily Note     Today's date: 2021  Patient name: Ned Worley  : 2005  MRN: [id number]  Referring provider: Ana Lilia Alcala PA-C  Dx:   Encounter Diagnosis     ICD-10-CM    1  Arthrofibrosis of knee joint, left  M24 662    2  Status post arthroscopy of left knee  Z98 890    3  Acute lateral meniscus tear of left knee, subsequent encounter  S83 282D    4  S/P left knee arthroscopy  Z98 890                   Subjective: Saw orthopedist yesterday and will follow up with them again in one week  If ROM not improved, plan to manipulate and cast in knee flexion for 2 days  Objective: See treatment diary below      Assessment: Tolerated treatment fair  Patient would benefit from continued PT  Capsular end-feel with PROM of the knee into flexion  Minimal soreness noted  No complaints of pain  Patient reported tightness/pain in VMO at end-range stretch  Trial of TPR to VMO at end-range abolished feelings of pain/tightness  PROM to 90 degrees      Plan: Continue per plan of care  Precautions: WBAT      Manuals 9/9 9/10 9/13 9/14 9/15 9/16 9/2 9/3 9/7 9/8   PROM left knee JF seated and supine GR KB seated and supine JF seated and supine SC seated and long sit against wall  JF seated and long sit against wall  GR JF 92 PROM and 85 AROM JG 90 PROM, 82 AROM GR   Patellar mobs JF GR KB JF SC ROM only  JF   JG GR   Prone quad stretch JF 3x30"   JF 3x30" SC 3x 30"  JF 3x30"   [place] Held   STM left quad and hamstring       STM to quad with AROM knee flexion/extension ROM ; STM proximal calf and ITB during PB squats against wall 2x10 ea  STM to quad with AROM knee flexion/extension ROM ; STM proximal calf and ITB during PB squats against wall 2x10 ea   STM to quad with AROM knee flexion/extension ROM     Knee extension stretch  GR  JF SC JF   JG 3 x 30"    Tibiofemoral medial and lateral shearing in sidelying             Inferior fibular head mob             Tibiofemoral joint distraction with hip hike TPR left VMO with knee flexion end-range      JF       Neuro Re-Ed             SLS left             H/s isometric heel digs / contract-relax          Performed   Knee extension isometric contract/relax JF         Performed                Ther Ex             Bike Rocking x 10' Recumbent 10 min  Recumbent 10 mi Recumbent 10 mi recumbent 10 min  recumbent 10 min  Upright bike, rocking, 10 min pright bike, rocking, 10 min pright bike, rocking, 10 min Upright bike, rocking  10 min   Heel slides with strap 5"x30        5 x 30"    Matrix LLLD stretch NV  x5 min into flexion x5 min into flexion 15# X 5 min into flexion 15#  X 5 min into flexion 20#   consider NV with MH    SLRx3 standing jeet  Step knee flexion stretch 3x30"                         Seated knee flexion with scoot Instructed Off chair onto step 10x20" Off chair onto step 10x20" Off chair onto step 10x20" Off stair onto step 10" 20x   10x10" with box 10x10" with box 10x10" with box    TKE Matrix             Seated knee flexion/extension AAROM off plinthe with self-overpressure             ITB foam rolling NV      3x1' 3x1' 3x1'    Quad foam rolling NV      3x1' 3x1' 3x1'    Patient education: sports psychiatrist, barriers to stretching/rehab     SC on HEP and stretching  GR                   Ther Activity             Step-ups F/L NV  6" 2x10          Lateral step-downs NV   6" 2x10 NV 6"x20       TG single leg squat             Quadruped rocking       2 min 2 min NV    Wall squats with PB x15 chair, x15 box  VC's to avoid pelvic rotation x30 with box x30 8 in box & stool  x30 8 in box & stool  30x 8" box and stool  20x with chair and box 20x with chair and box 10"x15  Pressure at distal ITB insertion at lateral knee 20x ea with chair and box 10"x15    Pressure at distal ITB insertion at lateral knee    Supine wall slides into knee flexion with self-overpressure       10x10"      Total gym: squats  L19 3x10 L19 3x10 L19 3x10 L 19 3x 10  L22 2x15       Gait Training             VC's for knee flexion with swing phase of gait JF                         Modalities             CP prn after session Additional Progress Note...

## 2025-05-31 NOTE — ED PROVIDER NOTE - OBJECTIVE STATEMENT
65-year-old female past medical history of hyperlipidemia, CAD status post stent presenting for palpitations and chest discomfort.  Patient states approximately 30 minutes prior to arrival she had acute onset palpitations associated with the chest tightness spread across her bilateral chest without radiation to the back or to the neck or arm.  Was at dinner while this happened, was not exerting herself and no other particular trigger.  Has intermittently had palpitations over the past several months and has been worked up and suggested that this may be due to atrial fibrillation but is never been formally diagnosed.  Today lasting much longer and currently ongoing.  No associated diaphoresis, nausea or vomiting.  No recent fever, chills, cough, sore throat.  No lower extremity pain or swelling.  Does not take any anticoagulant, was previously on Plavix but was instructed by her cardiologist that she no longer needs to take it.

## 2025-05-31 NOTE — ED CDU PROVIDER INITIAL DAY NOTE - PROGRESS NOTE DETAILS
Patient evaluated bedside.  No acute distress.  Vital signs are stable.  Will continue cardiac monitoring overnight.  Patient denies any chest pains at this time.  Plan for stress test in AM.  Cardiology following. Marleny Galvez PA-C

## 2025-05-31 NOTE — ED CDU PROVIDER DISPOSITION NOTE - PATIENT PORTAL LINK FT
You can access the FollowMyHealth Patient Portal offered by [facility] by registering at the following website: [email or web address]. By joining [employer]’s FollowMyHealth portal, you will also be able to view your health information using other applications (apps) compatible with our system.

## 2025-05-31 NOTE — ED CDU PROVIDER INITIAL DAY NOTE - OBJECTIVE STATEMENT
65-year-old female past medical history of hyperlipidemia, CAD status post stent presenting for palpitations and chest discomfort.  Patient states approximately 30 minutes prior to arrival she had acute onset palpitations associated with the chest tightness spread across her bilateral chest without radiation to the back or to the neck or arm.  Was at dinner while this happened, was not exerting herself and no other particular trigger.  Has intermittently had palpitations over the past several months and has been worked up and suggested that this may be due to atrial fibrillation but is never been formally diagnosed.  Today lasting much longer and currently ongoing.  No associated diaphoresis, nausea or vomiting.  No recent fever, chills, cough, sore throat.  No lower extremity pain or swelling.  Does not take any anticoagulant, was previously on Plavix but was instructed by her cardiologist that she no longer needs to take it.    In the ED patient initially found to be tachycardic to the 140s.  EKG showing A-fib with RVR.  Patient given metoprolol with subsequent conversion to normal sinus rhythm.  Labs otherwise unremarkable.  Cardiology consulted and recommending observation in CDU overnight on telemetry and stress test in a.m.

## 2025-05-31 NOTE — CONSULT NOTE ADULT - SUBJECTIVE AND OBJECTIVE BOX
Cardiology Consult Note   [Please check [email or web address] password: [email or web address]" for cardiology service schedule and contact information]    HPI:  65F w/ pmhx of cad s/p pci to D1 (2024), HLD presenting with palpitations and vague chest discomfort  Endorses off and on palpitations for several years  Typically short, ~10-15 sec and resolves spontaneously  Has had multiple holters with outpatient cardiologist Dr Briggs- was reportedly to receive a cMRI based on most recent holter results but not available for review  At home, measured HR to be in the 140s for which she came to the ED    In the ED, was found to be in afib  Converted to NSR/sinus yadi with IV metoprolol  Has been in NSR since then  Still feels some vague chest discomfort, unclear if similar to prior symptoms which led to PCI  ECG with NSR, LBBB, lateral TWI, poor R wave progression- similar to prior    PAST MEDICAL & SURGICAL HISTORY:  Malignant Neoplasm of Thyroid Gland      HLD (hyperlipidemia)      S/P Left Oophorectomy  1981      S/P Carpal Tunnel Release  right - 2000      Impacted [place]  Tooth  1980      S/P thyroidectomy        FAMILY HISTORY:    SOCIAL HISTORY:  unchanged    MEDICATIONS:  apixaban 5 milliGRAM(s) Oral every 12 hours            levothyroxine 88 MICROGram(s) Oral daily  rosuvastatin 40 milliGRAM(s) Oral daily        REVIEW OF SYSTEMS:  CV: chest pain (+), palpitation (+), orthopnea (-), PND (-), edema (-)  PULM: SOB (-), cough (-), wheezing (-), hemoptysis (-).   CONST: fever (-), chills (-) or fatigue (+)  GI: abdominal distension (-), abdominal pain (-) , nausea/vomiting (-), hematemesis, (-), melena (-), hematochezia (-)  : dysuria (-), frequency (-), hematuria (-).   NEURO: numbness (-), weakness (-), dizziness (-)  SKIN: itching (-), rash (-)  HEENT:  visual changes (-); vertigo or throat pain (-);  neck stiffness (-)     All other review of systems is negative unless indicated above.   -------------------------------------------------------------------------------------------  PHYSICAL EXAM:  T(C): 36.4 (05-31-25 @ 11:10), Max: 37 (05-31-25 @ 01:44)  HR: 58 (05-31-25 @ 11:10) (58 - 142)  BP: 136/76 (05-31-25 @ 11:10) (91/54 - 143/93)  RR: 18 (05-31-25 @ 11:10) (18 - 22)  SpO2: 97% (05-31-25 @ 11:10) (96% - 98%)  Wt(kg): --  I&O's Summary      GENERAL: NAD  HEAD: Atraumatic, Normocephalic.  EYES: EOMI, PERRLA, conjunctiva and sclera clear.  ENT: Moist mucous membranes.  NECK: Supple, No JVD.  CHEST/LUNG: Clear to auscultation bilaterally; No rales, rhonchi, wheezing, or rubs. Unlabored respirations.  HEART: Regular rate and rhythm; No murmurs, rubs, or gallops.  ABDOMEN: Bowel sounds present; Soft, Nontender, Nondistended.   EXTREMITIES:  2+ Peripheral Pulses, brisk capillary refill. No clubbing, cyanosis, or edema.  PSYCH: Normal affect.  SKIN: No rashes or lesions.    -------------------------------------------------------------------------------------------  LABS:                          14.1   8.44  )-----------( 244      ( 31 May 2025 01:05 )             42.4     05-31    142  |  105  |  19  ----------------------------<  120[H]  3.5   |  19[L]  |  0.91    Ca    9.6      31 May 2025 01:05  Mg     2.2     05-31    TPro  7.3  /  Alb  4.6  /  TBili  0.2  /  DBili  x   /  AST  53[H]  /  ALT  51[H]  /  AlkPhos  105  05-31    PT/INR - ( 31 May 2025 01:05 )   PT: 10.7 sec;   INR: 0.94 ratio         PTT - ( 31 May 2025 01:05 )  PTT:34.7 sec  CARDIAC MARKERS ( 31 May 2025 09:55 )  15 ng/L / x     / x     / x     / x     / x      CARDIAC MARKERS ( 31 May 2025 06:04 )  19 ng/L / x     / x     / x     / x     / x      CARDIAC MARKERS ( 31 May 2025 03:14 )  15 ng/L / x     / x     / x     / x     / x      CARDIAC MARKERS ( 31 May 2025 01:05 )  7 ng/L / x     / x     / x     / x     / x

## 2025-05-31 NOTE — ED CDU PROVIDER INITIAL DAY NOTE - CLINICAL SUMMARY MEDICAL DECISION MAKING FREE TEXT BOX
65-year-old female history of CAD with stents, hyperlipidemia, presenting with palpitations and chest discomfort.  Patient found to be in A-fib with RVR in the 160s overnight, received metoprolol and converted to normal sinus rhythm.  Of note 1 month ago patient was in the CDU for TIA workup.  Patient seen at bedside, currently resting comfortably, however patient's troponin x 3 were 7, 15, 19. Pt with normal exam, d/w cards, placed in CDU For trending trops and stress test.

## 2025-05-31 NOTE — ED CDU PROVIDER DISPOSITION NOTE - CLINICAL COURSE
65-year-old female past medical history of hyperlipidemia, CAD status post stent presenting for palpitations and chest discomfort.  Patient states approximately 30 minutes prior to arrival she had acute onset palpitations associated with the chest tightness spread across her bilateral chest without radiation to the back or to the neck or arm.  Was at dinner while this happened, was not exerting herself and no other particular trigger.  Has intermittently had palpitations over the past several months and has been worked up and suggested that this may be due to atrial fibrillation but is never been formally diagnosed.  Today lasting much longer and currently ongoing.  No associated diaphoresis, nausea or vomiting.  No recent fever, chills, cough, sore throat.  No lower extremity pain or swelling.  Does not take any anticoagulant, was previously on Plavix but was instructed by her cardiologist that she no longer needs to take it.    In the ED patient initially found to be tachycardic to the 140s.  EKG showing A-fib with RVR.  Patient given metoprolol with subsequent conversion to normal sinus rhythm.  Labs otherwise unremarkable.  Cardiology consulted and recommending observation in CDU overnight on telemetry and stress test in a.m. 65-year-old female past medical history of hyperlipidemia, CAD status post stent presenting for palpitations and chest discomfort.  Patient states approximately 30 minutes prior to arrival she had acute onset palpitations associated with the chest tightness spread across her bilateral chest without radiation to the back or to the neck or arm.  Was at dinner while this happened, was not exerting herself and no other particular trigger.  Has intermittently had palpitations over the past several months and has been worked up and suggested that this may be due to atrial fibrillation but is never been formally diagnosed.  Today lasting much longer and currently ongoing.  No associated diaphoresis, nausea or vomiting.  No recent fever, chills, cough, sore throat.  No lower extremity pain or swelling.  Does not take any anticoagulant, was previously on Plavix but was instructed by her cardiologist that she no longer needs to take it.    In the ED patient initially found to be tachycardic to the 140s.  EKG showing A-fib with RVR.  Patient given metoprolol with subsequent conversion to normal sinus rhythm.  Labs otherwise unremarkable.  Cardiology consulted and recommending observation in CDU overnight on telemetry and stress test in a.m.  stress test remarkable for small apical scar. Per Dr. Fierro, cleared for d/c with TTE outpt. pt to be d/cd on eliquis and plavix, will stop aspirin. Per Endocrinology, Free T4 1.3 (at goal). Recommend decrease total weekly dose slightly to 88 mcg daily with 1/2 tab on Sundays instead of a whole given borderline low TSH and afib. Can repeat TFTs with Dr. Pittman in 6-8 weeks. c/d/w Dr. Landry, stable for d/c.

## 2025-05-31 NOTE — ED ADULT NURSE NOTE - NSFALLRISKINTERV_ED_ALL_ED

## 2025-05-31 NOTE — ED ADULT TRIAGE NOTE - ACCOMPANIED BY
Introduction Text (Please End With A Colon): The following procedure was deferred:
Detail Level: Detailed
Spouse/Significant other

## 2025-05-31 NOTE — ED ADULT NURSE REASSESSMENT NOTE - NS ED NURSE REASSESS COMMENT FT1
Pt received from CHANTELL Purvis. Pt oriented to CDU & plan of care was discussed. Pt A&O x 4. Pt in CDU for telemetry, Nuclear stress and vital signs q4. Cards following. Pt denies any chest pain, SOB, dizziness or palpitations at this time. V/S stable, pt afebrile, right metacarpal 20g  IV in place, patent and free of signs of infiltration. Pt resting in bed. Safety & comfort measures maintained. Call bell in reach. Care continues.

## 2025-05-31 NOTE — ED PROVIDER NOTE - CLINICAL SUMMARY MEDICAL DECISION MAKING FREE TEXT BOX
65-year-old female past medical history of hyperlipidemia, CAD status post stent presenting for palpitations and chest discomfort. With initial vital signs with tachycardia to the 140s to 160s and initial EKG showing A-fib with RVR, with left bundle branch block that is old, no diagnostic ST segment change.  Presenting for acute onset palpitations in the setting of suspected A-fib but no formal diagnosis and without current anticoagulation, without a background of infectious complaints or other triggers known.  Concern for paroxysmal A-fib, taking fluids and meds, obtain labs and x-ray, likely to require anticoagulation and admission for further management.

## 2025-06-01 VITALS
RESPIRATION RATE: 18 BRPM | OXYGEN SATURATION: 97 % | DIASTOLIC BLOOD PRESSURE: 69 MMHG | SYSTOLIC BLOOD PRESSURE: 127 MMHG | TEMPERATURE: 98 F | HEART RATE: 72 BPM

## 2025-06-01 DIAGNOSIS — C73 MALIGNANT NEOPLASM OF THYROID GLAND: ICD-10-CM

## 2025-06-01 DIAGNOSIS — E89.0 POSTPROCEDURAL HYPOTHYROIDISM: ICD-10-CM

## 2025-06-01 LAB
A1C WITH ESTIMATED AVERAGE GLUCOSE RESULT: 5.6 % — SIGNIFICANT CHANGE UP (ref 4–5.6)
ADD ON TEST-SPECIMEN IN LAB: SIGNIFICANT CHANGE UP
CHOLEST SERPL-MCNC: 135 MG/DL — SIGNIFICANT CHANGE UP
ESTIMATED AVERAGE GLUCOSE: 114 MG/DL — SIGNIFICANT CHANGE UP (ref 68–114)
HDLC SERPL-MCNC: 46 MG/DL — LOW
LDLC SERPL-MCNC: 71 MG/DL — SIGNIFICANT CHANGE UP
LIPID PNL WITH DIRECT LDL SERPL: 71 MG/DL — SIGNIFICANT CHANGE UP
NONHDLC SERPL-MCNC: 89 MG/DL — SIGNIFICANT CHANGE UP
TRIGL SERPL-MCNC: 99 MG/DL — SIGNIFICANT CHANGE UP

## 2025-06-01 PROCEDURE — 99285 EMERGENCY DEPT VISIT HI MDM: CPT | Mod: 25

## 2025-06-01 PROCEDURE — 93016 CV STRESS TEST SUPVJ ONLY: CPT

## 2025-06-01 PROCEDURE — 93005 ELECTROCARDIOGRAM TRACING: CPT | Mod: 76

## 2025-06-01 PROCEDURE — 99204 OFFICE O/P NEW MOD 45 MIN: CPT

## 2025-06-01 PROCEDURE — 84443 ASSAY THYROID STIM HORMONE: CPT

## 2025-06-01 PROCEDURE — 84484 ASSAY OF TROPONIN QUANT: CPT

## 2025-06-01 PROCEDURE — 78452 HT MUSCLE IMAGE SPECT MULT: CPT | Mod: 26

## 2025-06-01 PROCEDURE — 78452 HT MUSCLE IMAGE SPECT MULT: CPT

## 2025-06-01 PROCEDURE — 84480 ASSAY TRIIODOTHYRONINE (T3): CPT

## 2025-06-01 PROCEDURE — 36415 COLL VENOUS BLD VENIPUNCTURE: CPT

## 2025-06-01 PROCEDURE — 84436 ASSAY OF TOTAL THYROXINE: CPT

## 2025-06-01 PROCEDURE — 85025 COMPLETE CBC W/AUTO DIFF WBC: CPT

## 2025-06-01 PROCEDURE — G0378: CPT

## 2025-06-01 PROCEDURE — 93018 CV STRESS TEST I&R ONLY: CPT

## 2025-06-01 PROCEDURE — 84439 ASSAY OF FREE THYROXINE: CPT

## 2025-06-01 PROCEDURE — 83880 ASSAY OF NATRIURETIC PEPTIDE: CPT

## 2025-06-01 PROCEDURE — 96374 THER/PROPH/DIAG INJ IV PUSH: CPT | Mod: XU

## 2025-06-01 PROCEDURE — 80053 COMPREHEN METABOLIC PANEL: CPT

## 2025-06-01 PROCEDURE — 80061 LIPID PANEL: CPT

## 2025-06-01 PROCEDURE — 71045 X-RAY EXAM CHEST 1 VIEW: CPT

## 2025-06-01 PROCEDURE — 83735 ASSAY OF MAGNESIUM: CPT

## 2025-06-01 PROCEDURE — 99238 HOSP IP/OBS DSCHRG MGMT 30/<: CPT

## 2025-06-01 PROCEDURE — 85610 PROTHROMBIN TIME: CPT

## 2025-06-01 PROCEDURE — 83036 HEMOGLOBIN GLYCOSYLATED A1C: CPT

## 2025-06-01 PROCEDURE — A9500: CPT

## 2025-06-01 PROCEDURE — 85730 THROMBOPLASTIN TIME PARTIAL: CPT

## 2025-06-01 PROCEDURE — 93017 CV STRESS TEST TRACING ONLY: CPT

## 2025-06-01 RX ORDER — CLOPIDOGREL BISULFATE 75 MG/1
1 TABLET, FILM COATED ORAL
Qty: 30 | Refills: 0
Start: 2025-06-01

## 2025-06-01 RX ORDER — LEVOTHYROXINE SODIUM 300 MCG
75 TABLET ORAL DAILY
Refills: 0 | Status: DISCONTINUED | OUTPATIENT
Start: 2025-06-01 | End: 2025-06-03

## 2025-06-01 RX ORDER — APIXABAN 2.5 MG/1
1 TABLET, FILM COATED ORAL
Qty: 60 | Refills: 0
Start: 2025-06-01

## 2025-06-01 RX ADMIN — APIXABAN 5 MILLIGRAM(S): 2.5 TABLET, FILM COATED ORAL at 12:52

## 2025-06-01 RX ADMIN — CLOPIDOGREL BISULFATE 75 MILLIGRAM(S): 75 TABLET, FILM COATED ORAL at 12:51

## 2025-06-01 RX ADMIN — Medication 75 MICROGRAM(S): at 12:51

## 2025-06-01 RX ADMIN — ROSUVASTATIN CALCIUM 40 MILLIGRAM(S): 20 TABLET, FILM COATED ORAL at 12:53

## 2025-06-01 RX ADMIN — APIXABAN 5 MILLIGRAM(S): 2.5 TABLET, FILM COATED ORAL at 23:24

## 2025-06-01 NOTE — ED CDU PROVIDER SUBSEQUENT DAY NOTE - NSICDXPASTSURGICALHX_GEN_ALL_CORE_FT
PAST SURGICAL HISTORY:  Impacted [place]  Tooth 1980    S/P Carpal Tunnel Release right - 2000    S/P Left Oophorectomy 1981    S/P thyroidectomy

## 2025-06-01 NOTE — CONSULT NOTE ADULT - SUBJECTIVE AND OBJECTIVE BOX
HPI:  66 y/o F w/ hx of post-operative hypothyroidism on levothyroxine 88 mcg daily      PAST MEDICAL & SURGICAL HISTORY:  Malignant Neoplasm of Thyroid Gland      HLD (hyperlipidemia)      S/P Left Oophorectomy  1981      S/P Carpal Tunnel Release  right - 2000      Impacted [place]  Tooth  1980      S/P thyroidectomy          FAMILY HISTORY:      Social History:    Outpatient Medications:  Levothyroxine 88 mcg daily    MEDICATIONS  (STANDING):  apixaban 5 milliGRAM(s) Oral every 12 hours  clopidogrel Tablet 75 milliGRAM(s) Oral daily  rosuvastatin 40 milliGRAM(s) Oral daily    MEDICATIONS  (PRN):      Allergies    No Known Allergies    Intolerances      Review of Systems:  Constitutional: No fever, No change in weight  Eyes: No blurry vision  Neuro: No headache, No paresthesias  HEENT: No throat pain  Cardiovascular: No chest pain  Respiratory: No SOB  GI: No nausea or vomiting  : No polyuria  Skin: no rash  Psych: no depression  Endocrine: No polydipsia, No heat or cold intolerance, rest as noted in HPI  Hem/lymph: no swelling    All other review of systems negative      PHYSICAL EXAM:  VITALS: T(C): 36.4 (06-01-25 @ 07:38)  T(F): 97.6 (06-01-25 @ 07:38), Max: 98 (06-01-25 @ 05:38)  HR: 65 (06-01-25 @ 07:38) (65 - 84)  BP: 100/65 (06-01-25 @ 07:38) (100/65 - 122/83)  RR:  (18 - 18)  SpO2:  (95% - 99%)  Wt(kg): --  GENERAL: NAD at this time  EYES: No proptosis, EOMI  HEENT:  Atraumatic, Normocephalic,   THYROID: Normal size, no palpable nodules  RESPIRATORY: Clear to auscultation bilaterally, full excursion, non-labored  CARDIOVASCULAR: Regular rhythm; No murmurs; no peripheral edema  GI: Soft, nontender, non distended, normal bowel sounds  SKIN: Dry, intact, No rashes or lesions  MUSCULOSKELETAL: normal strength  NEURO: follows commands, no tremor, normal reflexes  PSYCH: Alert and oriented x 3, normal affect, normal mood  CUSHING'S SIGNS: no striae                                  14.1   8.44  )-----------( 244      ( 31 May 2025 01:05 )             42.4       05-31    142  |  105  |  19  ----------------------------<  120[H]  3.5   |  19[L]  |  0.91    eGFR: 69    Ca    9.6      05-31  Mg     2.2     05-31    TPro  7.3  /  Alb  4.6  /  TBili  0.2  /  DBili  x   /  AST  53[H]  /  ALT  51[H]  /  AlkPhos  105  05-31    Thyroid Function Tests:  05-31 @ 10:11 TSH 0.21 FreeT4 -- T3 -- Anti TPO -- Anti Thyroglobulin Ab -- TSI --          06-01 Chol 135 Direct LDL -- LDL calculated 71 HDL 46[L] Trig 99, 04-09 Chol 133 Direct LDL -- LDL calculated 60 HDL 45[L] Trig 161[H]  Radiology:                  HPI:  66 y/o F w/ hx of post-operative hypothyroidism s/p total thyroidectomy around 15 years ago by Dr. Dora Olivas for thyroid nodule reportedly malignant. Pt. unsure if it was papillary. Since surgery has been on a stable dose of levothyroxine 88 mcg daily with adherence. Never had HANNON treatment. No hx of head or neck radiation. No immunotherapy, lithium amiodarone, recent infections. Has noticed increased palpitations with activity over the past few months. Has been following with Dr. Lamont Pittman and was told her thyroid levels were good and not to change anything recently. No weight loss, tremors, heat intolerance. No family hx of thyroid disease. Denies dysphagia, dysphonia, or neck pain.     PAST MEDICAL & SURGICAL HISTORY:  Malignant Neoplasm of Thyroid Gland      HLD (hyperlipidemia)      S/P Left Oophorectomy  1981      S/P Carpal Tunnel Release  right - 2000      Impacted [place]  Tooth  1980      S/P thyroidectomy          FAMILY HISTORY: No family hx of thyroid disease      Social History: No tobacco use. + social alcohol use    Outpatient Medications:  Levothyroxine 88 mcg daily    MEDICATIONS  (STANDING):  apixaban 5 milliGRAM(s) Oral every 12 hours  clopidogrel Tablet 75 milliGRAM(s) Oral daily  rosuvastatin 40 milliGRAM(s) Oral daily    MEDICATIONS  (PRN):      Allergies    No Known Allergies    Intolerances      Review of Systems:  Constitutional: No fever, No change in weight  Eyes: No blurry vision  Neuro: No headache, No paresthesias  HEENT: No throat pain  Cardiovascular: No chest pain +palpitations  Respiratory: No SOB  GI: No nausea or vomiting  : No polyuria  Skin: no rash  Psych: no depression  Endocrine: No polydipsia, No heat or cold intolerance, rest as noted in HPI  Hem/lymph: no swelling    All other review of systems negative      PHYSICAL EXAM:  VITALS: T(C): 36.4 (06-01-25 @ 07:38)  T(F): 97.6 (06-01-25 @ 07:38), Max: 98 (06-01-25 @ 05:38)  HR: 65 (06-01-25 @ 07:38) (65 - 84)  BP: 100/65 (06-01-25 @ 07:38) (100/65 - 122/83)  RR:  (18 - 18)  SpO2:  (95% - 99%)  Wt(kg): --  GENERAL: NAD at this time  EYES: No proptosis, EOMI  HEENT:  Atraumatic, Normocephalic,   THYROID: Normal size, no palpable nodules  RESPIRATORY: Clear to auscultation bilaterally, full excursion, non-labored  CARDIOVASCULAR: Regular rhythm; No murmurs; no peripheral edema  GI: Soft, nontender, non distended, normal bowel sounds  SKIN: Dry, intact, No rashes or lesions  MUSCULOSKELETAL: normal strength  NEURO: follows commands  PSYCH: Alert and oriented x 3, normal affect, normal mood  CUSHING'S SIGNS: no striae                                  14.1   8.44  )-----------( 244      ( 31 May 2025 01:05 )             42.4       05-31    142  |  105  |  19  ----------------------------<  120[H]  3.5   |  19[L]  |  0.91    eGFR: 69    Ca    9.6      05-31  Mg     2.2     05-31    TPro  7.3  /  Alb  4.6  /  TBili  0.2  /  DBili  x   /  AST  53[H]  /  ALT  51[H]  /  AlkPhos  105  05-31    Thyroid Function Tests:  05-31 @ 10:11 TSH 0.21 FreeT4 -- T3 -- Anti TPO -- Anti Thyroglobulin Ab -- TSI --          06-01 Chol 135 Direct LDL -- LDL calculated 71 HDL 46[L] Trig 99, 04-09 Chol 133 Direct LDL -- LDL calculated 60 HDL 45[L] Trig 161[H]  Radiology:

## 2025-06-01 NOTE — ED CDU PROVIDER SUBSEQUENT DAY NOTE - ATTENDING APP SHARED VISIT CONTRIBUTION OF CARE
65-year-old female PMH hypothyroid,, HLD, CAD status post PCI, comes to ER complaint of episode of palpitations 2 nights ago.  Subsequent seen in ER found to be in A-fib with RVR.  Patient states she has had episodes of much briefer palpitations that resolved without treatment.  Had a Holter done several weeks ago reportedly normal did not capture any events.  Patient currently feeling well in CDU waiting for further testing.  Physical exam adult female awake alert GCS 15  HEENT normocephalic atraumatic.  CV no rubs gallops murmur.  Rate regular.  Abdomen soft positive bowel sounds  Neuro GCS 15 speech fluent moves all extremities.  Manuel Rosario MD, Facep

## 2025-06-01 NOTE — ED CDU PROVIDER SUBSEQUENT DAY NOTE - CLINICAL SUMMARY MEDICAL DECISION MAKING FREE TEXT BOX
adult female presents with complaints of palpitations, A-fib with RVR rate controlled CDU for further evaluation and final cards consults.  Manuel Rosario MD, Facep adult female presents with complaints of palpitations, A-fib with RVR rate controlled CDU for stress/echo and final cards consults.Plan will discuss with endocrine given history of hypothyroidism, new A-fib and low TSH.  Manuel Rosario MD, Facep

## 2025-06-01 NOTE — CONSULT NOTE ADULT - ASSESSMENT
65F w/ pmhx of cad s/p pci to D1 (2024), HLD presenting with palpitations and vague chest discomfort    #Afib  Likely paroxsymal afib, chronic per patients description of palpitations over past several years  Converted to NSR with IV metop  - eliquis 5mg bid  - continue home toprol 25mg bid  - recheck tsh    #Chest pain  S/p PCI to D1 in 2024 (99%)  - switch ASA to plavix  -- plan for dual therapy with plavix, eliquis  - continue statin  - TTE  - NST tomorrow (no caffeine diet after midnight)
66 y/o F w/ post-operative hypothyroidism on levothyroxine 88 mcg daily now with atrial fibrillation.

## 2025-06-01 NOTE — ED CDU PROVIDER SUBSEQUENT DAY NOTE - DATE/TIME 3
Medication(s) Requested: Temezapam  Last office visit: 07/05/2023  NV:  7/9/2024  Last refill: 4/15/2024  Is the patient due for refill of this medication(s): Yes  PDMP review: Criteria met. Prescription printed for Physician/KENYA signature.       Prescription sent to provider.  It is noted the medication is not listed in the providers plan at the last visit.    01-Jun-2025 19:15

## 2025-06-01 NOTE — ED CDU PROVIDER SUBSEQUENT DAY NOTE - PROGRESS NOTE DETAILS
Endorsed to Dr ADRIANE Landry, Final cs rec pending  Manuel Rosario MD, Facep Pt resting comfortably. NAD. No complaints. VSS. no events on tele. Pt with stress test remarkable for small apical scar. pt seen by endo, awaiting free T4 to determine dose of synthroid. d/w Dr. Fierro, cleared for d/c with TTE outpt. pt to be d/cd on eliquis and plavix, will stop aspirin. Per Endocrinology, Free T4 1.3 (at goal). Recommend decrease total weekly dose slightly to 88 mcg daily with 1/2 tab on Sundays instead of a whole given borderline low TSH and afib. Can repeat TFTs with Dr. Pittman in 6-8 weeks. c/d/w Dr. Landry, stable for d/c. Per Dr. Fierro, cleared for d/c with TTE outpt. pt to be d/cd on eliquis and plavix, will stop aspirin. CDU PROGRESS NOTE DESTINY ODOM: Received pt at 1900 sign-out. Case/plan reviewed. Pt resting in stretcher in NAD. VSS. Pt at ambulatory around unit with steady gait. S1 S2 noted, RRR, lungs CTA b/l, BS x4 with soft, nontender abdomen. Pt without complaints. Pending results of Free T4, will continue to monitor, f/u Endocrine recs.

## 2025-06-01 NOTE — CONSULT NOTE ADULT - PROBLEM SELECTOR RECOMMENDATION 9
Pt. with postoperative hypothyroidism s/p total thyroidectomy on levothyroxine 88 mcg daily with TSH at goal for history of thyroid cancer. Will need Free T4 to assess for dose adjustment. Given new onset afib can change TSH goal to 0.4 and decrease levothyroxine to 75 mcg daily for now. Will base further recs on Free T4.  Outpatient endocrine follow-up recommended.        Brandon Gutierrez D.O  [phone] Pt. with postoperative hypothyroidism s/p total thyroidectomy on levothyroxine 88 mcg daily with TSH at goal for history of thyroid cancer. Will need Free T4 to assess for dose adjustment. Given new onset afib can change TSH goal to 0.4 and decrease levothyroxine to 75 mcg daily for now. Will base further recs on Free T4.

## 2025-06-01 NOTE — ED ADULT NURSE REASSESSMENT NOTE - NS ED NURSE REASSESS COMMENT FT1
Patient is A&Ox4. Independent. Patient is in CDU pending Cardiology final recommendation and Endocrinology lab results that was sent out today. Pt denies any chest pain, SOB, dizziness or palpitations. V/S stable, pt afebrile, left metacarpal 20g IV in place, patent and free of signs of infiltration. Pt resting in bed. Safety & comfort measures maintained. Call bell in reach. Care continues.

## 2025-06-01 NOTE — CONSULT NOTE ADULT - PROVIDER SPECIALTY LIST ADULT
Cardiology
[de-identified] : I personally reviewed the MRI/CT scan images and agree with the radiologist's report. The radiological findings were discussed with the patient  Patient is presenting with acute/sub-acute radicular pain with impairment in ADLs and functionality.  The pain has not responded to  conservative care including nsaid therapy and/or physical therapy.  There is no bleeding tendency, unstable medical condition, or systemic infection. The proposed procedure corresponds clinically to the dermatomal pattern of the affected nerve/nerves.   will proceed with lesi l4-5
Endocrinology

## 2025-06-01 NOTE — CONSULT NOTE ADULT - PROBLEM SELECTOR RECOMMENDATION 2
Outpatient endocrine follow-up recommended with Dr. Pittman.   Check TG and Thyroid US as outpatient.         Brandon Gutierrez D.O  [phone] Outpatient endocrine follow-up recommended with Dr. Pittman.   Check TG and Thyroid US as outpatient.       Addendum: Free T4 1.3 (at goal). Recommend decrease total weekly dose slightly to 88 mcg daily with 1/2 tab on Sundays instead of a whole given borderline low TSH and afib.   Can repeat TFTs with Dr. Pittman in 6-8 weeks.  Will sign off at this time. Please reconsult if needed.       Brandon Gutierrez D.O  [phone]

## 2025-06-01 NOTE — ED CDU PROVIDER SUBSEQUENT DAY NOTE - HISTORY
Patient seen at bedside in NAD.  VSS.  Patient resting comfortably without complaints. Plan for tele monitoring. Stress in AM. Will monitor. Marleny Galvez PA-C

## 2025-06-01 NOTE — ED ADULT NURSE REASSESSMENT NOTE - NS ED NURSE REASSESS COMMENT FT1
Pt received from CHANTELL Leary. Pt oriented to CDU & plan of care was discussed. Pt A&O x 4. Pt in CDU for monitor on tele, Stress test, cardiology consult. Pt denies any chest pain, SOB, dizziness or palpitations as of now. Pt on a cardiac monitor in NSR, HR in 60's. V/S stable, pt afebrile,  IV in place, patent and free of signs of infiltration. Pt resting in bed. Safety & comfort measures maintained. Call bell in reach. Will continue to monitor.

## 2025-07-01 ENCOUNTER — APPOINTMENT (OUTPATIENT)
Dept: ELECTROPHYSIOLOGY | Facility: CLINIC | Age: 65
End: 2025-07-01
Payer: COMMERCIAL

## 2025-07-01 VITALS
SYSTOLIC BLOOD PRESSURE: 119 MMHG | BODY MASS INDEX: 27.6 KG/M2 | DIASTOLIC BLOOD PRESSURE: 73 MMHG | OXYGEN SATURATION: 95 % | WEIGHT: 150 LBS | HEIGHT: 62 IN | HEART RATE: 61 BPM

## 2025-07-01 PROBLEM — I25.10 CAD (CORONARY ARTERY DISEASE): Status: ACTIVE | Noted: 2025-07-01

## 2025-07-01 PROBLEM — Z95.5 HISTORY OF HEART ARTERY STENT: Status: ACTIVE | Noted: 2025-07-01

## 2025-07-01 PROBLEM — I48.0 PAF (PAROXYSMAL ATRIAL FIBRILLATION): Status: ACTIVE | Noted: 2025-07-01

## 2025-07-01 PROBLEM — R00.2 PALPITATIONS: Status: ACTIVE | Noted: 2025-07-01

## 2025-07-01 PROCEDURE — 93000 ELECTROCARDIOGRAM COMPLETE: CPT

## 2025-07-01 PROCEDURE — G2211 COMPLEX E/M VISIT ADD ON: CPT | Mod: NC

## 2025-07-01 PROCEDURE — 99204 OFFICE O/P NEW MOD 45 MIN: CPT

## 2025-07-01 RX ORDER — METOPROLOL SUCCINATE 25 MG/1
25 TABLET, EXTENDED RELEASE ORAL
Qty: 9 | Refills: 3 | Status: ACTIVE | COMMUNITY
Start: 2025-07-01

## 2025-07-01 RX ORDER — APIXABAN 5 MG/1
5 TABLET, FILM COATED ORAL
Qty: 60 | Refills: 5 | Status: ACTIVE | COMMUNITY
Start: 2025-07-01

## 2025-07-08 PROBLEM — I47.29 NSVT (NONSUSTAINED VENTRICULAR TACHYCARDIA): Status: ACTIVE | Noted: 2025-07-08

## 2025-07-23 ENCOUNTER — OUTPATIENT (OUTPATIENT)
Dept: OUTPATIENT SERVICES | Facility: HOSPITAL | Age: 65
LOS: 1 days | End: 2025-07-23

## 2025-07-23 VITALS
HEART RATE: 62 BPM | TEMPERATURE: 98 F | OXYGEN SATURATION: 97 % | WEIGHT: 147.93 LBS | DIASTOLIC BLOOD PRESSURE: 75 MMHG | RESPIRATION RATE: 16 BRPM | SYSTOLIC BLOOD PRESSURE: 124 MMHG

## 2025-07-23 DIAGNOSIS — I48.0 PAROXYSMAL ATRIAL FIBRILLATION: ICD-10-CM

## 2025-07-23 DIAGNOSIS — E89.0 POSTPROCEDURAL HYPOTHYROIDISM: Chronic | ICD-10-CM

## 2025-07-23 LAB
ANION GAP SERPL CALC-SCNC: 12 MMOL/L — SIGNIFICANT CHANGE UP (ref 7–14)
BASOPHILS # BLD AUTO: 0.04 K/UL — SIGNIFICANT CHANGE UP (ref 0–0.2)
BASOPHILS NFR BLD AUTO: 0.7 % — SIGNIFICANT CHANGE UP (ref 0–2)
BLD GP AB SCN SERPL QL: NEGATIVE — SIGNIFICANT CHANGE UP
BUN SERPL-MCNC: 14 MG/DL — SIGNIFICANT CHANGE UP (ref 7–23)
CALCIUM SERPL-MCNC: 9.4 MG/DL — SIGNIFICANT CHANGE UP (ref 8.4–10.5)
CHLORIDE SERPL-SCNC: 103 MMOL/L — SIGNIFICANT CHANGE UP (ref 98–107)
CO2 SERPL-SCNC: 27 MMOL/L — SIGNIFICANT CHANGE UP (ref 22–31)
CREAT SERPL-MCNC: 0.75 MG/DL — SIGNIFICANT CHANGE UP (ref 0.5–1.3)
EGFR: 87 ML/MIN/1.73M2 — SIGNIFICANT CHANGE UP
EGFR: 87 ML/MIN/1.73M2 — SIGNIFICANT CHANGE UP
EOSINOPHIL # BLD AUTO: 0.08 K/UL — SIGNIFICANT CHANGE UP (ref 0–0.5)
EOSINOPHIL NFR BLD AUTO: 1.3 % — SIGNIFICANT CHANGE UP (ref 0–6)
GLUCOSE SERPL-MCNC: 72 MG/DL — SIGNIFICANT CHANGE UP (ref 70–99)
HCT VFR BLD CALC: 40.7 % — SIGNIFICANT CHANGE UP (ref 34.5–45)
HGB BLD-MCNC: 13.7 G/DL — SIGNIFICANT CHANGE UP (ref 11.5–15.5)
IMM GRANULOCYTES NFR BLD AUTO: 0.3 % — SIGNIFICANT CHANGE UP (ref 0–0.9)
LYMPHOCYTES # BLD AUTO: 1.54 K/UL — SIGNIFICANT CHANGE UP (ref 1–3.3)
LYMPHOCYTES # BLD AUTO: 25.1 % — SIGNIFICANT CHANGE UP (ref 13–44)
MCHC RBC-ENTMCNC: 29.1 PG — SIGNIFICANT CHANGE UP (ref 27–34)
MCHC RBC-ENTMCNC: 33.7 G/DL — SIGNIFICANT CHANGE UP (ref 32–36)
MCV RBC AUTO: 86.4 FL — SIGNIFICANT CHANGE UP (ref 80–100)
MONOCYTES # BLD AUTO: 0.29 K/UL — SIGNIFICANT CHANGE UP (ref 0–0.9)
MONOCYTES NFR BLD AUTO: 4.7 % — SIGNIFICANT CHANGE UP (ref 2–14)
NEUTROPHILS # BLD AUTO: 4.16 K/UL — SIGNIFICANT CHANGE UP (ref 1.8–7.4)
NEUTROPHILS NFR BLD AUTO: 67.9 % — SIGNIFICANT CHANGE UP (ref 43–77)
PLATELET # BLD AUTO: 253 K/UL — SIGNIFICANT CHANGE UP (ref 150–400)
POTASSIUM SERPL-MCNC: 3.5 MMOL/L — SIGNIFICANT CHANGE UP (ref 3.5–5.3)
POTASSIUM SERPL-SCNC: 3.5 MMOL/L — SIGNIFICANT CHANGE UP (ref 3.5–5.3)
RBC # BLD: 4.71 M/UL — SIGNIFICANT CHANGE UP (ref 3.8–5.2)
RBC # FLD: 13.2 % — SIGNIFICANT CHANGE UP (ref 10.3–14.5)
RH IG SCN BLD-IMP: POSITIVE — SIGNIFICANT CHANGE UP
RH IG SCN BLD-IMP: POSITIVE — SIGNIFICANT CHANGE UP
SODIUM SERPL-SCNC: 142 MMOL/L — SIGNIFICANT CHANGE UP (ref 135–145)
WBC # BLD: 6.13 K/UL — SIGNIFICANT CHANGE UP (ref 3.8–10.5)
WBC # FLD AUTO: 6.13 K/UL — SIGNIFICANT CHANGE UP (ref 3.8–10.5)

## 2025-07-23 RX ORDER — CLOPIDOGREL BISULFATE 75 MG/1
1 TABLET, FILM COATED ORAL
Refills: 0 | DISCHARGE

## 2025-07-30 ENCOUNTER — OUTPATIENT (OUTPATIENT)
Dept: OUTPATIENT SERVICES | Facility: HOSPITAL | Age: 65
LOS: 1 days | End: 2025-07-30
Payer: COMMERCIAL

## 2025-07-30 ENCOUNTER — TRANSCRIPTION ENCOUNTER (OUTPATIENT)
Age: 65
End: 2025-07-30

## 2025-07-30 VITALS
HEART RATE: 70 BPM | RESPIRATION RATE: 15 BRPM | SYSTOLIC BLOOD PRESSURE: 102 MMHG | DIASTOLIC BLOOD PRESSURE: 80 MMHG | OXYGEN SATURATION: 97 %

## 2025-07-30 VITALS
DIASTOLIC BLOOD PRESSURE: 58 MMHG | WEIGHT: 149.91 LBS | HEIGHT: 62 IN | OXYGEN SATURATION: 100 % | HEART RATE: 55 BPM | TEMPERATURE: 98 F | SYSTOLIC BLOOD PRESSURE: 138 MMHG | RESPIRATION RATE: 16 BRPM

## 2025-07-30 DIAGNOSIS — I48.0 PAROXYSMAL ATRIAL FIBRILLATION: ICD-10-CM

## 2025-07-30 DIAGNOSIS — E89.0 POSTPROCEDURAL HYPOTHYROIDISM: Chronic | ICD-10-CM

## 2025-07-30 PROCEDURE — 93010 ELECTROCARDIOGRAM REPORT: CPT | Mod: 76

## 2025-07-30 PROCEDURE — 93656 COMPRE EP EVAL ABLTJ ATR FIB: CPT

## 2025-07-30 PROCEDURE — 93623 PRGRMD STIMJ&PACG IV RX NFS: CPT | Mod: 26

## 2025-07-30 RX ORDER — FENTANYL CITRATE-0.9 % NACL/PF 100MCG/2ML
50 SYRINGE (ML) INTRAVENOUS
Refills: 0 | Status: DISCONTINUED | OUTPATIENT
Start: 2025-07-30 | End: 2025-07-30

## 2025-07-30 RX ORDER — KETOCONAZOLE 20 MG/G
1 CREAM TOPICAL
Refills: 0 | DISCHARGE

## 2025-07-30 RX ORDER — APIXABAN 5 MG/1
1 TABLET, FILM COATED ORAL
Qty: 0 | Refills: 0 | DISCHARGE

## 2025-07-30 RX ORDER — FENTANYL CITRATE-0.9 % NACL/PF 100MCG/2ML
25 SYRINGE (ML) INTRAVENOUS
Refills: 0 | Status: DISCONTINUED | OUTPATIENT
Start: 2025-07-30 | End: 2025-07-30

## 2025-07-30 RX ORDER — ONDANSETRON HCL/PF 4 MG/2 ML
4 VIAL (ML) INJECTION ONCE
Refills: 0 | Status: DISCONTINUED | OUTPATIENT
Start: 2025-07-30 | End: 2025-08-13

## 2025-07-30 RX ORDER — ROSUVASTATIN CALCIUM 20 MG/1
1 TABLET, FILM COATED ORAL
Refills: 0 | DISCHARGE

## 2025-07-30 RX ORDER — ASPIRIN 325 MG
1 TABLET ORAL
Refills: 0 | DISCHARGE

## 2025-07-30 RX ORDER — METOPROLOL SUCCINATE 50 MG/1
1 TABLET, EXTENDED RELEASE ORAL
Refills: 0 | DISCHARGE

## 2025-09-05 ENCOUNTER — NON-APPOINTMENT (OUTPATIENT)
Age: 65
End: 2025-09-05

## 2025-09-05 ENCOUNTER — APPOINTMENT (OUTPATIENT)
Dept: ELECTROPHYSIOLOGY | Facility: CLINIC | Age: 65
End: 2025-09-05
Payer: COMMERCIAL

## 2025-09-05 VITALS
WEIGHT: 153 LBS | DIASTOLIC BLOOD PRESSURE: 68 MMHG | HEIGHT: 62 IN | BODY MASS INDEX: 28.16 KG/M2 | SYSTOLIC BLOOD PRESSURE: 120 MMHG | OXYGEN SATURATION: 95 % | HEART RATE: 64 BPM

## 2025-09-05 DIAGNOSIS — Z98.890 OTHER SPECIFIED POSTPROCEDURAL STATES: ICD-10-CM

## 2025-09-05 DIAGNOSIS — R00.2 PALPITATIONS: ICD-10-CM

## 2025-09-05 DIAGNOSIS — Z86.79 OTHER SPECIFIED POSTPROCEDURAL STATES: ICD-10-CM

## 2025-09-05 DIAGNOSIS — I47.29 OTHER VENTRICULAR TACHYCARDIA: ICD-10-CM

## 2025-09-05 DIAGNOSIS — I25.10 ATHEROSCLEROTIC HEART DISEASE OF NATIVE CORONARY ARTERY W/OUT ANGINA PECTORIS: ICD-10-CM

## 2025-09-05 PROBLEM — I48.0 PAROXYSMAL ATRIAL FIBRILLATION: Chronic | Status: ACTIVE | Noted: 2025-07-23

## 2025-09-05 PROBLEM — N83.202 UNSPECIFIED OVARIAN CYST, LEFT SIDE: Chronic | Status: ACTIVE | Noted: 2025-07-23

## 2025-09-05 PROCEDURE — G2211 COMPLEX E/M VISIT ADD ON: CPT | Mod: NC

## 2025-09-05 PROCEDURE — 93000 ELECTROCARDIOGRAM COMPLETE: CPT

## 2025-09-05 PROCEDURE — 99214 OFFICE O/P EST MOD 30 MIN: CPT

## 2025-09-05 RX ORDER — BLOOD SUGAR DIAGNOSTIC
100 STRIP MISCELLANEOUS
Refills: 0 | Status: ACTIVE | COMMUNITY
Start: 2025-09-05